# Patient Record
Sex: FEMALE | Race: WHITE | NOT HISPANIC OR LATINO | Employment: OTHER | URBAN - METROPOLITAN AREA
[De-identification: names, ages, dates, MRNs, and addresses within clinical notes are randomized per-mention and may not be internally consistent; named-entity substitution may affect disease eponyms.]

---

## 2020-08-17 LAB
ALBUMIN SERPL-MCNC: 4 G/DL (ref 3.5–4.6)
ALP BLD-CCNC: 44 U/L (ref 40–130)
ALT SERPL-CCNC: 9 U/L (ref 0–33)
ANION GAP SERPL CALCULATED.3IONS-SCNC: 13 MEQ/L (ref 9–15)
AST SERPL-CCNC: 21 U/L (ref 0–35)
BILIRUB SERPL-MCNC: 0.8 MG/DL (ref 0.2–0.7)
BUN BLDV-MCNC: 20 MG/DL (ref 8–23)
CALCIUM SERPL-MCNC: 9.3 MG/DL (ref 8.5–9.9)
CHLORIDE BLD-SCNC: 101 MEQ/L (ref 95–107)
CHOLESTEROL, TOTAL: 131 MG/DL (ref 0–199)
CO2: 23 MEQ/L (ref 20–31)
CREAT SERPL-MCNC: 0.67 MG/DL (ref 0.5–0.9)
GFR AFRICAN AMERICAN: >60
GFR NON-AFRICAN AMERICAN: >60
GLOBULIN: 3.1 G/DL (ref 2.3–3.5)
GLUCOSE BLD-MCNC: 86 MG/DL (ref 70–99)
HDLC SERPL-MCNC: 49 MG/DL (ref 40–59)
LDL CHOLESTEROL CALCULATED: 69 MG/DL (ref 0–129)
POTASSIUM SERPL-SCNC: 4.3 MEQ/L (ref 3.4–4.9)
SODIUM BLD-SCNC: 137 MEQ/L (ref 135–144)
T4 FREE: 0.94 NG/DL (ref 0.84–1.68)
TOTAL PROTEIN: 7.1 G/DL (ref 6.3–8)
TRIGL SERPL-MCNC: 63 MG/DL (ref 0–150)
TSH SERPL DL<=0.05 MIU/L-ACNC: 3.12 UIU/ML (ref 0.44–3.86)

## 2023-11-28 ENCOUNTER — APPOINTMENT (OUTPATIENT)
Dept: RADIOLOGY | Facility: HOSPITAL | Age: 83
End: 2023-11-28
Payer: MEDICARE

## 2023-11-28 ENCOUNTER — HOSPITAL ENCOUNTER (OUTPATIENT)
Facility: HOSPITAL | Age: 83
Setting detail: OBSERVATION
Discharge: NURSING, REHAB OR LONG-TERM CARE | End: 2023-12-01
Attending: EMERGENCY MEDICINE | Admitting: INTERNAL MEDICINE
Payer: MEDICARE

## 2023-11-28 ENCOUNTER — APPOINTMENT (OUTPATIENT)
Dept: CARDIOLOGY | Facility: HOSPITAL | Age: 83
End: 2023-11-28
Payer: MEDICARE

## 2023-11-28 DIAGNOSIS — R42 DIZZINESS: ICD-10-CM

## 2023-11-28 DIAGNOSIS — D64.9 ANEMIA, UNSPECIFIED TYPE: ICD-10-CM

## 2023-11-28 DIAGNOSIS — N17.9 ACUTE KIDNEY INJURY (CMS-HCC): ICD-10-CM

## 2023-11-28 DIAGNOSIS — R26.2 UNABLE TO AMBULATE: ICD-10-CM

## 2023-11-28 DIAGNOSIS — N30.00 ACUTE CYSTITIS WITHOUT HEMATURIA: ICD-10-CM

## 2023-11-28 DIAGNOSIS — R53.1 GENERALIZED WEAKNESS: Primary | ICD-10-CM
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
## 2023-11-28 LAB
ALBUMIN SERPL BCP-MCNC: 4.1 G/DL (ref 3.4–5)
ALP SERPL-CCNC: 56 U/L (ref 33–136)
ALT SERPL W P-5'-P-CCNC: 13 U/L (ref 7–45)
ANION GAP SERPL CALC-SCNC: 13 MMOL/L (ref 10–20)
APPEARANCE UR: CLEAR
AST SERPL W P-5'-P-CCNC: 26 U/L (ref 9–39)
BACTERIA #/AREA URNS AUTO: ABNORMAL /HPF
BASOPHILS # BLD AUTO: 0.03 X10*3/UL (ref 0–0.1)
BASOPHILS NFR BLD AUTO: 0.3 %
BILIRUB SERPL-MCNC: 0.8 MG/DL (ref 0–1.2)
BILIRUB UR STRIP.AUTO-MCNC: NEGATIVE MG/DL
BNP SERPL-MCNC: 94 PG/ML (ref 0–99)
BUN SERPL-MCNC: 30 MG/DL (ref 6–23)
CALCIUM SERPL-MCNC: 9.5 MG/DL (ref 8.6–10.3)
CARDIAC TROPONIN I PNL SERPL HS: 23 NG/L (ref 0–13)
CARDIAC TROPONIN I PNL SERPL HS: 24 NG/L (ref 0–13)
CHLORIDE SERPL-SCNC: 104 MMOL/L (ref 98–107)
CK SERPL-CCNC: 123 U/L (ref 0–215)
CO2 SERPL-SCNC: 25 MMOL/L (ref 21–32)
COLOR UR: YELLOW
CREAT SERPL-MCNC: 1.22 MG/DL (ref 0.5–1.05)
EOSINOPHIL # BLD AUTO: 0.04 X10*3/UL (ref 0–0.4)
EOSINOPHIL NFR BLD AUTO: 0.4 %
ERYTHROCYTE [DISTWIDTH] IN BLOOD BY AUTOMATED COUNT: 14.3 % (ref 11.5–14.5)
FLUAV RNA RESP QL NAA+PROBE: NOT DETECTED
FLUBV RNA RESP QL NAA+PROBE: NOT DETECTED
FOLATE SERPL-MCNC: >22.3 NG/ML
GFR SERPL CREATININE-BSD FRML MDRD: 44 ML/MIN/1.73M*2
GLUCOSE SERPL-MCNC: 103 MG/DL (ref 74–99)
GLUCOSE UR STRIP.AUTO-MCNC: NEGATIVE MG/DL
HCT VFR BLD AUTO: 31.3 % (ref 36–46)
HGB BLD-MCNC: 10.9 G/DL (ref 12–16)
HGB RETIC QN: 36 PG (ref 28–38)
HOLD SPECIMEN: NORMAL
IMM GRANULOCYTES # BLD AUTO: 0.02 X10*3/UL (ref 0–0.5)
IMM GRANULOCYTES NFR BLD AUTO: 0.2 % (ref 0–0.9)
IMMATURE RETIC FRACTION: 14.8 %
INR PPP: 1.1 (ref 0.9–1.1)
IRON SATN MFR SERPL: 15 % (ref 25–45)
IRON SERPL-MCNC: 46 UG/DL (ref 35–150)
KETONES UR STRIP.AUTO-MCNC: ABNORMAL MG/DL
LACTATE SERPL-SCNC: 1.3 MMOL/L (ref 0.4–2)
LEUKOCYTE ESTERASE UR QL STRIP.AUTO: ABNORMAL
LYMPHOCYTES # BLD AUTO: 1.58 X10*3/UL (ref 0.8–3)
LYMPHOCYTES NFR BLD AUTO: 15.8 %
MAGNESIUM SERPL-MCNC: 1.92 MG/DL (ref 1.6–2.4)
MCH RBC QN AUTO: 43.3 PG (ref 26–34)
MCHC RBC AUTO-ENTMCNC: 34.8 G/DL (ref 32–36)
MCV RBC AUTO: 124 FL (ref 80–100)
MONOCYTES # BLD AUTO: 1.33 X10*3/UL (ref 0.05–0.8)
MONOCYTES NFR BLD AUTO: 13.3 %
MUCOUS THREADS #/AREA URNS AUTO: ABNORMAL /LPF
NEUTROPHILS # BLD AUTO: 7.01 X10*3/UL (ref 1.6–5.5)
NEUTROPHILS NFR BLD AUTO: 70 %
NITRITE UR QL STRIP.AUTO: NEGATIVE
NRBC BLD-RTO: 0 /100 WBCS (ref 0–0)
OVALOCYTES BLD QL SMEAR: NORMAL
PATH REVIEW-CBC DIFFERENTIAL: NORMAL
PH UR STRIP.AUTO: 5 [PH]
PLATELET # BLD AUTO: 244 X10*3/UL (ref 150–450)
POTASSIUM SERPL-SCNC: 4.3 MMOL/L (ref 3.5–5.3)
PROT SERPL-MCNC: 7.9 G/DL (ref 6.4–8.2)
PROT UR STRIP.AUTO-MCNC: ABNORMAL MG/DL
PROTHROMBIN TIME: 12.5 SECONDS (ref 9.8–12.8)
RBC # BLD AUTO: 2.52 X10*6/UL (ref 4–5.2)
RBC # UR STRIP.AUTO: NEGATIVE /UL
RBC #/AREA URNS AUTO: ABNORMAL /HPF
RBC MORPH BLD: NORMAL
RETICS #: 0.05 X10*6/UL (ref 0.02–0.11)
RETICS/RBC NFR AUTO: 2.1 % (ref 0.5–2)
SARS-COV-2 RNA RESP QL NAA+PROBE: NOT DETECTED
SODIUM SERPL-SCNC: 138 MMOL/L (ref 136–145)
SP GR UR STRIP.AUTO: 1.02
TIBC SERPL-MCNC: 313 UG/DL (ref 240–445)
TSH SERPL-ACNC: 4.84 MIU/L (ref 0.44–3.98)
UIBC SERPL-MCNC: 267 UG/DL (ref 110–370)
UROBILINOGEN UR STRIP.AUTO-MCNC: <2 MG/DL
VIT B12 SERPL-MCNC: 54 PG/ML (ref 211–911)
WBC # BLD AUTO: 10 X10*3/UL (ref 4.4–11.3)
WBC #/AREA URNS AUTO: ABNORMAL /HPF

## 2023-11-28 PROCEDURE — 85025 COMPLETE CBC W/AUTO DIFF WBC: CPT | Performed by: PHYSICIAN ASSISTANT

## 2023-11-28 PROCEDURE — 80053 COMPREHEN METABOLIC PANEL: CPT | Performed by: PHYSICIAN ASSISTANT

## 2023-11-28 PROCEDURE — 71045 X-RAY EXAM CHEST 1 VIEW: CPT | Performed by: RADIOLOGY

## 2023-11-28 PROCEDURE — 83880 ASSAY OF NATRIURETIC PEPTIDE: CPT | Performed by: PHYSICIAN ASSISTANT

## 2023-11-28 PROCEDURE — 83540 ASSAY OF IRON: CPT | Performed by: INTERNAL MEDICINE

## 2023-11-28 PROCEDURE — 93005 ELECTROCARDIOGRAM TRACING: CPT

## 2023-11-28 PROCEDURE — 73610 X-RAY EXAM OF ANKLE: CPT | Mod: RT,FY

## 2023-11-28 PROCEDURE — 72125 CT NECK SPINE W/O DYE: CPT

## 2023-11-28 PROCEDURE — 82607 VITAMIN B-12: CPT | Performed by: INTERNAL MEDICINE

## 2023-11-28 PROCEDURE — 83735 ASSAY OF MAGNESIUM: CPT | Performed by: PHYSICIAN ASSISTANT

## 2023-11-28 PROCEDURE — 2500000004 HC RX 250 GENERAL PHARMACY W/ HCPCS (ALT 636 FOR OP/ED): Performed by: PHYSICIAN ASSISTANT

## 2023-11-28 PROCEDURE — 82746 ASSAY OF FOLIC ACID SERUM: CPT | Performed by: INTERNAL MEDICINE

## 2023-11-28 PROCEDURE — 36415 COLL VENOUS BLD VENIPUNCTURE: CPT | Performed by: PHYSICIAN ASSISTANT

## 2023-11-28 PROCEDURE — G0378 HOSPITAL OBSERVATION PER HR: HCPCS

## 2023-11-28 PROCEDURE — 96365 THER/PROPH/DIAG IV INF INIT: CPT | Performed by: INTERNAL MEDICINE

## 2023-11-28 PROCEDURE — 71045 X-RAY EXAM CHEST 1 VIEW: CPT | Mod: FY

## 2023-11-28 PROCEDURE — 87186 SC STD MICRODIL/AGAR DIL: CPT | Mod: ELYLAB | Performed by: PHYSICIAN ASSISTANT

## 2023-11-28 PROCEDURE — 84443 ASSAY THYROID STIM HORMONE: CPT | Performed by: INTERNAL MEDICINE

## 2023-11-28 PROCEDURE — 70450 CT HEAD/BRAIN W/O DYE: CPT | Performed by: RADIOLOGY

## 2023-11-28 PROCEDURE — 70450 CT HEAD/BRAIN W/O DYE: CPT

## 2023-11-28 PROCEDURE — 84484 ASSAY OF TROPONIN QUANT: CPT | Performed by: PHYSICIAN ASSISTANT

## 2023-11-28 PROCEDURE — 2500000004 HC RX 250 GENERAL PHARMACY W/ HCPCS (ALT 636 FOR OP/ED): Performed by: INTERNAL MEDICINE

## 2023-11-28 PROCEDURE — 73610 X-RAY EXAM OF ANKLE: CPT | Mod: RIGHT SIDE | Performed by: RADIOLOGY

## 2023-11-28 PROCEDURE — 85045 AUTOMATED RETICULOCYTE COUNT: CPT | Performed by: INTERNAL MEDICINE

## 2023-11-28 PROCEDURE — 99285 EMERGENCY DEPT VISIT HI MDM: CPT | Performed by: EMERGENCY MEDICINE

## 2023-11-28 PROCEDURE — 85060 BLOOD SMEAR INTERPRETATION: CPT | Performed by: PHYSICIAN ASSISTANT

## 2023-11-28 PROCEDURE — 83605 ASSAY OF LACTIC ACID: CPT | Performed by: PHYSICIAN ASSISTANT

## 2023-11-28 PROCEDURE — 72125 CT NECK SPINE W/O DYE: CPT | Performed by: RADIOLOGY

## 2023-11-28 PROCEDURE — 87086 URINE CULTURE/COLONY COUNT: CPT | Mod: ELYLAB | Performed by: PHYSICIAN ASSISTANT

## 2023-11-28 PROCEDURE — 85610 PROTHROMBIN TIME: CPT | Performed by: PHYSICIAN ASSISTANT

## 2023-11-28 PROCEDURE — 72131 CT LUMBAR SPINE W/O DYE: CPT

## 2023-11-28 PROCEDURE — 82550 ASSAY OF CK (CPK): CPT | Performed by: PHYSICIAN ASSISTANT

## 2023-11-28 PROCEDURE — 96375 TX/PRO/DX INJ NEW DRUG ADDON: CPT | Performed by: INTERNAL MEDICINE

## 2023-11-28 PROCEDURE — 87636 SARSCOV2 & INF A&B AMP PRB: CPT | Performed by: PHYSICIAN ASSISTANT

## 2023-11-28 PROCEDURE — 99223 1ST HOSP IP/OBS HIGH 75: CPT | Performed by: INTERNAL MEDICINE

## 2023-11-28 PROCEDURE — 82728 ASSAY OF FERRITIN: CPT | Mod: ELYLAB | Performed by: INTERNAL MEDICINE

## 2023-11-28 PROCEDURE — 72131 CT LUMBAR SPINE W/O DYE: CPT | Performed by: RADIOLOGY

## 2023-11-28 PROCEDURE — 81001 URINALYSIS AUTO W/SCOPE: CPT | Performed by: PHYSICIAN ASSISTANT

## 2023-11-28 RX ORDER — ACETAMINOPHEN 160 MG/5ML
650 SOLUTION ORAL EVERY 4 HOURS PRN
Status: DISCONTINUED | OUTPATIENT
Start: 2023-11-28 | End: 2023-11-29

## 2023-11-28 RX ORDER — SODIUM CHLORIDE, SODIUM LACTATE, POTASSIUM CHLORIDE, CALCIUM CHLORIDE 600; 310; 30; 20 MG/100ML; MG/100ML; MG/100ML; MG/100ML
75 INJECTION, SOLUTION INTRAVENOUS CONTINUOUS
Status: DISCONTINUED | OUTPATIENT
Start: 2023-11-28 | End: 2023-11-30

## 2023-11-28 RX ORDER — MECLIZINE HCL 12.5 MG 12.5 MG/1
25 TABLET ORAL 3 TIMES DAILY PRN
Status: DISCONTINUED | OUTPATIENT
Start: 2023-11-28 | End: 2023-12-01 | Stop reason: HOSPADM

## 2023-11-28 RX ORDER — POLYETHYLENE GLYCOL 3350 17 G/17G
17 POWDER, FOR SOLUTION ORAL DAILY PRN
Status: DISCONTINUED | OUTPATIENT
Start: 2023-11-28 | End: 2023-12-01 | Stop reason: HOSPADM

## 2023-11-28 RX ORDER — ATORVASTATIN CALCIUM 10 MG/1
10 TABLET, FILM COATED ORAL NIGHTLY
COMMUNITY

## 2023-11-28 RX ORDER — ACETAMINOPHEN 325 MG/1
650 TABLET ORAL ONCE
Status: COMPLETED | OUTPATIENT
Start: 2023-11-28 | End: 2023-11-28

## 2023-11-28 RX ORDER — TRAMADOL HYDROCHLORIDE 50 MG/1
50 TABLET ORAL EVERY 8 HOURS PRN
Status: DISCONTINUED | OUTPATIENT
Start: 2023-11-28 | End: 2023-12-01 | Stop reason: HOSPADM

## 2023-11-28 RX ORDER — LOSARTAN POTASSIUM AND HYDROCHLOROTHIAZIDE 12.5; 1 MG/1; MG/1
1 TABLET ORAL DAILY
COMMUNITY

## 2023-11-28 RX ORDER — ACETAMINOPHEN 650 MG/1
650 SUPPOSITORY RECTAL EVERY 4 HOURS PRN
Status: DISCONTINUED | OUTPATIENT
Start: 2023-11-28 | End: 2023-11-29

## 2023-11-28 RX ORDER — ONDANSETRON HYDROCHLORIDE 2 MG/ML
4 INJECTION, SOLUTION INTRAVENOUS ONCE
Status: COMPLETED | OUTPATIENT
Start: 2023-11-28 | End: 2023-11-28

## 2023-11-28 RX ORDER — CEFTRIAXONE 1 G/50ML
1 INJECTION, SOLUTION INTRAVENOUS EVERY 24 HOURS
Status: DISCONTINUED | OUTPATIENT
Start: 2023-11-28 | End: 2023-12-01 | Stop reason: HOSPADM

## 2023-11-28 RX ORDER — ACETAMINOPHEN 325 MG/1
650 TABLET ORAL EVERY 4 HOURS PRN
Status: DISCONTINUED | OUTPATIENT
Start: 2023-11-28 | End: 2023-11-29

## 2023-11-28 RX ORDER — HEPARIN SODIUM 5000 [USP'U]/ML
5000 INJECTION, SOLUTION INTRAVENOUS; SUBCUTANEOUS EVERY 8 HOURS
Status: DISCONTINUED | OUTPATIENT
Start: 2023-11-28 | End: 2023-12-01 | Stop reason: HOSPADM

## 2023-11-28 RX ADMIN — ONDANSETRON 4 MG: 2 INJECTION INTRAMUSCULAR; INTRAVENOUS at 14:50

## 2023-11-28 RX ADMIN — SODIUM CHLORIDE, POTASSIUM CHLORIDE, SODIUM LACTATE AND CALCIUM CHLORIDE 75 ML/HR: 600; 310; 30; 20 INJECTION, SOLUTION INTRAVENOUS at 22:35

## 2023-11-28 RX ADMIN — ACETAMINOPHEN 650 MG: 325 TABLET ORAL at 17:23

## 2023-11-28 RX ADMIN — CEFTRIAXONE SODIUM 1 G: 1 INJECTION, SOLUTION INTRAVENOUS at 23:30

## 2023-11-28 SDOH — SOCIAL STABILITY: SOCIAL INSECURITY: DOES ANYONE TRY TO KEEP YOU FROM HAVING/CONTACTING OTHER FRIENDS OR DOING THINGS OUTSIDE YOUR HOME?: NO

## 2023-11-28 SDOH — SOCIAL STABILITY: SOCIAL INSECURITY: ABUSE: ADULT

## 2023-11-28 SDOH — SOCIAL STABILITY: SOCIAL INSECURITY: HAS ANYONE EVER THREATENED TO HURT YOUR FAMILY OR YOUR PETS?: NO

## 2023-11-28 SDOH — SOCIAL STABILITY: SOCIAL INSECURITY: DO YOU FEEL UNSAFE GOING BACK TO THE PLACE WHERE YOU ARE LIVING?: NO

## 2023-11-28 SDOH — SOCIAL STABILITY: SOCIAL INSECURITY: HAVE YOU HAD THOUGHTS OF HARMING ANYONE ELSE?: NO

## 2023-11-28 SDOH — SOCIAL STABILITY: SOCIAL INSECURITY: ARE YOU OR HAVE YOU BEEN THREATENED OR ABUSED PHYSICALLY, EMOTIONALLY, OR SEXUALLY BY ANYONE?: NO

## 2023-11-28 SDOH — SOCIAL STABILITY: SOCIAL INSECURITY: DO YOU FEEL ANYONE HAS EXPLOITED OR TAKEN ADVANTAGE OF YOU FINANCIALLY OR OF YOUR PERSONAL PROPERTY?: NO

## 2023-11-28 SDOH — SOCIAL STABILITY: SOCIAL INSECURITY: WERE YOU ABLE TO COMPLETE ALL THE BEHAVIORAL HEALTH SCREENINGS?: YES

## 2023-11-28 SDOH — SOCIAL STABILITY: SOCIAL INSECURITY: ARE THERE ANY APPARENT SIGNS OF INJURIES/BEHAVIORS THAT COULD BE RELATED TO ABUSE/NEGLECT?: NO

## 2023-11-28 ASSESSMENT — ACTIVITIES OF DAILY LIVING (ADL)
FEEDING YOURSELF: INDEPENDENT
GROOMING: INDEPENDENT
TOILETING: INDEPENDENT
JUDGMENT_ADEQUATE_SAFELY_COMPLETE_DAILY_ACTIVITIES: YES
WALKS IN HOME: DEPENDENT
BATHING: INDEPENDENT
HEARING - LEFT EAR: DIFFICULTY WITH NOISE
DRESSING YOURSELF: INDEPENDENT
HEARING - RIGHT EAR: DIFFICULTY WITH NOISE
ADEQUATE_TO_COMPLETE_ADL: YES
LACK_OF_TRANSPORTATION: NO
ASSISTIVE_DEVICE: DENTURES UPPER;WHEELCHAIR
PATIENT'S MEMORY ADEQUATE TO SAFELY COMPLETE DAILY ACTIVITIES?: YES

## 2023-11-28 ASSESSMENT — COLUMBIA-SUICIDE SEVERITY RATING SCALE - C-SSRS
1. IN THE PAST MONTH, HAVE YOU WISHED YOU WERE DEAD OR WISHED YOU COULD GO TO SLEEP AND NOT WAKE UP?: NO
1. IN THE PAST MONTH, HAVE YOU WISHED YOU WERE DEAD OR WISHED YOU COULD GO TO SLEEP AND NOT WAKE UP?: NO
2. HAVE YOU ACTUALLY HAD ANY THOUGHTS OF KILLING YOURSELF?: NO
2. HAVE YOU ACTUALLY HAD ANY THOUGHTS OF KILLING YOURSELF?: NO
1. IN THE PAST MONTH, HAVE YOU WISHED YOU WERE DEAD OR WISHED YOU COULD GO TO SLEEP AND NOT WAKE UP?: NO
2. HAVE YOU ACTUALLY HAD ANY THOUGHTS OF KILLING YOURSELF?: NO
6. HAVE YOU EVER DONE ANYTHING, STARTED TO DO ANYTHING, OR PREPARED TO DO ANYTHING TO END YOUR LIFE?: NO

## 2023-11-28 ASSESSMENT — LIFESTYLE VARIABLES
HAVE PEOPLE ANNOYED YOU BY CRITICIZING YOUR DRINKING: NO
EVER FELT BAD OR GUILTY ABOUT YOUR DRINKING: NO
HOW MANY STANDARD DRINKS CONTAINING ALCOHOL DO YOU HAVE ON A TYPICAL DAY: PATIENT DOES NOT DRINK
HOW OFTEN DO YOU HAVE A DRINK CONTAINING ALCOHOL: NEVER
SKIP TO QUESTIONS 9-10: 1
HOW OFTEN DO YOU HAVE 6 OR MORE DRINKS ON ONE OCCASION: NEVER
AUDIT-C TOTAL SCORE: 0
HAVE YOU EVER FELT YOU SHOULD CUT DOWN ON YOUR DRINKING: NO
AUDIT-C TOTAL SCORE: 0
REASON UNABLE TO ASSESS: YES
EVER HAD A DRINK FIRST THING IN THE MORNING TO STEADY YOUR NERVES TO GET RID OF A HANGOVER: NO

## 2023-11-28 ASSESSMENT — COGNITIVE AND FUNCTIONAL STATUS - GENERAL
CLIMB 3 TO 5 STEPS WITH RAILING: TOTAL
STANDING UP FROM CHAIR USING ARMS: A LITTLE
WALKING IN HOSPITAL ROOM: A LOT
HELP NEEDED FOR BATHING: A LITTLE
DAILY ACTIVITIY SCORE: 21
MOVING TO AND FROM BED TO CHAIR: A LITTLE
TOILETING: A LITTLE
MOBILITY SCORE: 17
PATIENT BASELINE BEDBOUND: NO
DRESSING REGULAR LOWER BODY CLOTHING: A LITTLE

## 2023-11-28 ASSESSMENT — PAIN - FUNCTIONAL ASSESSMENT
PAIN_FUNCTIONAL_ASSESSMENT: 0-10
PAIN_FUNCTIONAL_ASSESSMENT: 0-10

## 2023-11-28 ASSESSMENT — PATIENT HEALTH QUESTIONNAIRE - PHQ9
1. LITTLE INTEREST OR PLEASURE IN DOING THINGS: NOT AT ALL
2. FEELING DOWN, DEPRESSED OR HOPELESS: NOT AT ALL
SUM OF ALL RESPONSES TO PHQ9 QUESTIONS 1 & 2: 0

## 2023-11-28 ASSESSMENT — PAIN SCALES - GENERAL
PAINLEVEL_OUTOF10: 0 - NO PAIN
PAINLEVEL_OUTOF10: 3
PAINLEVEL_OUTOF10: 0 - NO PAIN

## 2023-11-28 ASSESSMENT — PAIN DESCRIPTION - LOCATION: LOCATION: HEAD

## 2023-11-28 NOTE — ED PROVIDER NOTES
HPI   Chief Complaint   Patient presents with    Fall     Slid out of bed at 0100. Pt states that she caught herself and did not fall. Negative thinners, NIH negative.       An 83-year-old female patient gets around her home via wheelchair lives at home alone comes in the emergency department today with complaints of low back pain, ankle pain secondary to a fall out of her bed.  States she was transferring herself from her bed to her wheelchair like she does every day and has been doing very well doing so and she slid out of her bed onto the ground.  States she was unable to get herself back up.  States she she laid on the floor the whole night until her son was able to come and help her but was unable to get her up therefore EMS was called.  Rates pain a 5 out of 10 on the pain scale this present time.  Denies any her head or loss consciousness.  She has no other complaints this present time.  For this purpose she comes in the emergency department today for further evaluation.                          Salome Coma Scale Score: 15                  Patient History   No past medical history on file.  Past Surgical History:   Procedure Laterality Date    OTHER SURGICAL HISTORY  08/12/2020    Leg surgery     No family history on file.  Social History     Tobacco Use    Smoking status: Not on file    Smokeless tobacco: Not on file   Substance Use Topics    Alcohol use: Not on file    Drug use: Not on file       Physical Exam   ED Triage Vitals [11/28/23 1205]   Temp Heart Rate Resp BP   37.3 °C (99.1 °F) 84 20 176/69      SpO2 Temp src Heart Rate Source Patient Position   99 % -- -- --      BP Location FiO2 (%)     -- --       Physical Exam  Constitutional:       General: She is in acute distress (Mild distress).      Appearance: Normal appearance. She is not ill-appearing or diaphoretic.   HENT:      Head: Normocephalic and atraumatic.      Nose: Nose normal.   Eyes:      Extraocular Movements: Extraocular movements  intact.      Conjunctiva/sclera: Conjunctivae normal.      Pupils: Pupils are equal, round, and reactive to light.   Cardiovascular:      Rate and Rhythm: Normal rate and regular rhythm.   Pulmonary:      Effort: Pulmonary effort is normal. No respiratory distress.      Breath sounds: Normal breath sounds. No stridor. No wheezing.   Abdominal:      General: Abdomen is flat.      Palpations: Abdomen is soft.      Tenderness: There is no abdominal tenderness.   Musculoskeletal:         General: Tenderness (Tense palpation of the right lateral ankle, no pelvic tenderness on exam hip tenderness, shoulder tenderness) present. Normal range of motion.      Cervical back: Normal range of motion.   Skin:     General: Skin is warm and dry.   Neurological:      General: No focal deficit present.      Mental Status: She is alert and oriented to person, place, and time. Mental status is at baseline.   Psychiatric:         Mood and Affect: Mood normal.       ED Course & MDM   Diagnoses as of 11/28/23 1645   Generalized weakness   Unable to ambulate   Anemia, unspecified type   Acute kidney injury (CMS/HCC)       Medical Decision Making  An 83-year-old female patient gets around her home via wheelchair lives at home alone comes in the emergency department today with complaints of low back pain, ankle pain secondary to a fall out of her bed.  States she was transferring herself from her bed to her wheelchair like she does every day and has been doing very well doing so and she slid out of her bed onto the ground.  States she was unable to get herself back up.  States she she laid on the floor the whole night until her son was able to come and help her but was unable to get her up therefore EMS was called.  Rates pain a 5 out of 10 on the pain scale this present time.  Denies any her head or loss consciousness.  She has no other complaints this present time.  For this purpose she comes in the emergency department today for further  evaluation.    EKG, laboratory studies, CT study of the head, C-spine, L-spine ordered as well as ankle x-rays.  Rule out any acute intracranial bleed, calvarial fracture, spinal injury, ankle injury fracture or dislocation.  Rule out ACS, arrhythmia, electrolyte abnormality, urinary tract infection, elevated CK.    Patient's initial troponin elevated at 24-second 123 no ischemic findings on EKG.  Patient CBC does show a mild anemia with hemoglobin 10.9 hematocrit 31.3.  Patient's flu COVID test are negative as well.  BNP 94.  Patient has mild dehydration with creatinine 1.22 GFR 44.  Magnesium lactate INR all within normal limits.  As far as radiologic studies go lumbar films show no acute vertebral compression or deformity.  CT study of the head shows no acute intracranial hemorrhage or mass effect but does discuss protrusion of meninges and CSF through some occipital craniectomy defect.  Did discuss with the patient.  States she had procedure performed in 1998 secondary to fluid not coming out of her head.  States they removed things in her upper spine as well as head to get the fluid back through.  She was aware of these findings per her.'s x-ray of the ankle is negative for acute fracture or dislocation chest x-ray no acute cardiopulmonary abnormality.  I discussed all the findings with patient and son.  As patient does live alone and is wheelchair-bound would like to bring patient in for PT OT eval and rehab placement to get some strength back up so that she is able to function appropriately on her own at home.  Patient agrees and would like to move forward with this.    Historian is the patient    Diagnosis: Generalized weakness, unable to ambulate, anemia, acute renal insufficiency    I discussed the case with Dr. Monique agrees admit the patient under his service      Labs Reviewed   CBC WITH AUTO DIFFERENTIAL - Abnormal       Result Value    WBC 10.0      nRBC 0.0      RBC 2.52 (*)     Hemoglobin 10.9 (*)      Hematocrit 31.3 (*)      (*)     MCH 43.3 (*)     MCHC 34.8      RDW 14.3      Platelets 244      Neutrophils % 70.0      Immature Granulocytes %, Automated 0.2      Lymphocytes % 15.8      Monocytes % 13.3      Eosinophils % 0.4      Basophils % 0.3      Neutrophils Absolute 7.01 (*)     Immature Granulocytes Absolute, Automated 0.02      Lymphocytes Absolute 1.58      Monocytes Absolute 1.33 (*)     Eosinophils Absolute 0.04      Basophils Absolute 0.03     COMPREHENSIVE METABOLIC PANEL - Abnormal    Glucose 103 (*)     Sodium 138      Potassium 4.3      Chloride 104      Bicarbonate 25      Anion Gap 13      Urea Nitrogen 30 (*)     Creatinine 1.22 (*)     eGFR 44 (*)     Calcium 9.5      Albumin 4.1      Alkaline Phosphatase 56      Total Protein 7.9      AST 26      Bilirubin, Total 0.8      ALT 13     SERIAL TROPONIN-INITIAL - Abnormal    Troponin I, High Sensitivity 24 (*)     Narrative:     Less than 99th percentile of normal range cutoff-  Female and children under 18 years old <14 ng/L; Male <21 ng/L: Negative  Repeat testing should be performed if clinically indicated.     Female and children under 18 years old 14-50 ng/L; Male 21-50 ng/L:  Consistent with possible cardiac damage and possible increased clinical   risk. Serial measurements may help to assess extent of myocardial damage.     >50 ng/L: Consistent with cardiac damage, increased clinical risk and  myocardial infarction. Serial measurements may help assess extent of   myocardial damage.      NOTE: Children less than 1 year old may have higher baseline troponin   levels and results should be interpreted in conjunction with the overall   clinical context.     NOTE: Troponin I testing is performed using a different   testing methodology at [facility] than at other   [facility] hospitals. Direct result comparisons should only   be made within the same method.   SERIAL TROPONIN, 1 HOUR - Abnormal    Troponin I, High Sensitivity  23 (*)     Narrative:     Less than 99th percentile of normal range cutoff-  Female and children under 18 years old <14 ng/L; Male <21 ng/L: Negative  Repeat testing should be performed if clinically indicated.     Female and children under 18 years old 14-50 ng/L; Male 21-50 ng/L:  Consistent with possible cardiac damage and possible increased clinical   risk. Serial measurements may help to assess extent of myocardial damage.     >50 ng/L: Consistent with cardiac damage, increased clinical risk and  myocardial infarction. Serial measurements may help assess extent of   myocardial damage.      NOTE: Children less than 1 year old may have higher baseline troponin   levels and results should be interpreted in conjunction with the overall   clinical context.     NOTE: Troponin I testing is performed using a different   testing methodology at [facility] than at other   [facility]. Direct result comparisons should only   be made within the same method.   MAGNESIUM - Normal    Magnesium 1.92     LACTATE - Normal    Lactate 1.3      Narrative:     Venipuncture immediately after or during the administration of Metamizole may lead to falsely low results. Testing should be performed immediately  prior to Metamizole dosing.   PROTIME-INR - Normal    Protime 12.5      INR 1.1     B-TYPE NATRIURETIC PEPTIDE - Normal    BNP 94      Narrative:        <100 pg/mL - Heart failure unlikely  100-299 pg/mL - Intermediate probability of acute heart                  failure exacerbation. Correlate with clinical                  context and patient history.    >=300 pg/mL - Heart Failure likely. Correlate with clinical                  context and patient history.    BNP testing is performed using different testing methodology at [facility] than at other [facility]. Direct result comparisons should only be made within the same method.      SARS-COV-2 AND INFLUENZA A/B PCR - Normal    Flu A Result  Not Detected      Flu B Result Not Detected      Coronavirus 2019, PCR Not Detected      Narrative:     This assay has received FDA Emergency Use Authorization (EUA) and  is only authorized for the duration of time that circumstances exist to justify the authorization of the emergency use of in vitro diagnostic tests for the detection of SARS-CoV-2 virus and/or diagnosis of COVID-19 infection under section 564(b)(1) of the Act, 21 U.S.C. 360bbb-3(b)(1). Testing for SARS-CoV-2 is only recommended for patients who meet current clinical and/or epidemiological criteria as defined by federal, state, or local public health directives. This assay is an in vitro diagnostic nucleic acid amplification test for the qualitative detection of SARS-CoV-2, Influenza A, and Influenza B from nasopharyngeal specimens and has been validated for use at [facility]. Negative results do not preclude COVID-19 infections or Influenza A/B infections, and should not be used as the sole basis for diagnosis, treatment, or other management decisions. If Influenza A/B and RSV PCR results are negative, testing for Parainfluenza virus, Adenovirus and Metapneumovirus is routinely performed for [facility] pediatric oncology and intensive care inpatients, and is available on other patients by placing an add-on request.    CREATINE KINASE - Normal    Creatine Kinase 123     TROPONIN SERIES- (INITIAL, 1 HR)    Narrative:     The following orders were created for panel order Troponin I Series, High Sensitivity (0, 1 HR).  Procedure                               Abnormality         Status                     ---------                               -----------         ------                     Troponin I, High Sensiti...[[id number]]  Abnormal            Final result               Troponin, High Sensitivi...[[id number]]  Abnormal            Final result                 Please view results for these tests on the individual orders.   URINALYSIS  WITH REFLEX MICROSCOPIC AND CULTURE   PATH REVIEW-CBC DIFFERENTIAL    Pathologist Review-CBC Differential        Value: Macrocytosis and hypersegmented neutrophils suggestive of Folate or B12 deficiency.   MORPHOLOGY    RBC Morphology See Below      Ovalocytes Few          CT lumbar spine wo IV contrast   Final Result   No lumbar vertebral compression deformity or acute displaced fracture.        Scoliosis with multilevel degenerative changes and mild   spondylolisthesis.        Paraspinal/soft tissue findings as above including rectal fecal   retention and cholelithiasis.        MACRO:   None.        Signed by: Kimi Louis 11/28/2023 2:06 PM   Dictation workstation:   [id number]      CT head wo IV contrast   Final Result   No acute intracranial hemorrhage or mass-effect.        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        MACRO:   None.        Signed by: Kimi Louis 11/28/2023 1:55 PM   Dictation workstation:   [id number]      CT cervical spine wo IV contrast   Final Result   No cervical vertebral compression deformity or acute displaced   fracture. Tiny smooth possible remote fracture defect in the anterior   arch of C1 and probable surgical absence of the posterior arch of C1.   Patient is also status post suboccipital craniectomy.        Multilevel degenerative changes with mild spondylolisthesis.        Reticular densities in both lung apices, differential includes   pulmonary fibrosis.        MACRO:   None.        Signed by: Kimi Louis 11/28/2023 2:00 PM   Dictation workstation:   [id number]      XR ankle right 3+ views   Final Result   1. No evidence of acute fracture or dislocation.        MACRO:   None.        Signed by: Yohannes Velasquez 11/28/2023 1:31 PM   Dictation workstation:   [id number]      XR chest 1 view   Final Result   No acute cardiopulmonary abnormality.        Diffuse reticular interstitial pulmonary opacities suggestive of   fibrotic change.         Signed by: Edilberto Yoon 11/28/2023 1:09 PM   Dictation workstation:   [id number]            Procedure  ECG 12 lead    Performed by: Conor Diamond PA-C  Authorized by: Conor Diamond PA-C    Interpretation:     Details:  My EKG interpretation  Rate:     ECG rate:  85    ECG rate assessment: normal    Rhythm:     Rhythm: sinus rhythm and A-V block      A-V block: 1st Degree    ST segments:     ST segments:  Normal  T waves:     T waves: normal         Conor Diamond PA-C  11/28/23 1409

## 2023-11-28 NOTE — H&P
Medical Group History and Physical    ASSESSMENT & PLAN:     Mechanical fall  Generalized weakness  Chronic dizziness  Chronic cervical spinal DJD  Chronic lumbar spinal DJD  R ankle pain  - imaging in ED neg for acute findings  - CK wnl  - no fnd on exam, largely benign exam  Plan:  - PT/OT for possible placement  - pain control  - gentle IVF hydration  - check orthostatics once  - monitor on tele  - PRN meclizine  - check TSH    CKD3  - at baseline, monitor    Anemia  - Hgb 10.9 on admission, no recent baseline  - no obvious s/s bleeding  - check basic anemia labs    HTN  DLD  - home meds    Vte ppx sqh  Pt/ot ordered  DNR/DNI  Home meds once reconciled    Roger Phillips MD    HISTORY OF PRESENT ILLNESS:   Chief Complaint: mechanical fall, weakness    History Of Present Illness:    83F with PMH of HTN, DLD, anxiety who is presenting for mechanical fall from bed. She was transferring from bed to wheelchair, and slipped and fell to the ground from the bed. She was unable to get up. She have some low back pain and R ankle pain after the fall. She did not have syncope. She has been having worsening generalized weakness for the past year or two, unable to say exactly, she is poor historian. She also says one of her big issues is dizziness. Has been using a wheelchair since then for this. She lives alone. She is DNR/DNI.     In the ED, afebrile, HDS, on RA. Labs and imaging reviewed. No acute findings on imaging. Labs showed CKD3 stable, anemia Hgb 10.9, mild trop elevation 24, 23. EKG neg for acute ischemic changes. COVID/flu neg. CXR with b/l reticular opacities, possibly fibrosis.       Review of systems: 10 point review of systems is otherwise negative except as mentioned above.    PAST HISTORIES:     Past Medical History:  She has no past medical history on file.    Past Surgical History:  She has a past surgical history that includes Other surgical history (08/12/2020).      Social History:  She has no history  "on file for tobacco use, alcohol use, and drug use.    Family History:  No family history on file.     Allergies:  Patient has no known allergies.    OBJECTIVE:     Last Recorded Vitals:  Vitals:    11/28/23 1205 11/28/23 1622   BP: 176/69 119/69   BP Location:  Left arm   Patient Position:  Sitting   Pulse: 84 86   Resp: 20 18   Temp: 37.3 °C (99.1 °F)    SpO2: 99% 100%   Weight: 59 kg (130 lb)    Height: 1.422 m (4' 8\")        Last I/O:  No intake/output data recorded.    Physical Exam:  GEN: healthy appearing, appears stated age, NAD  HEENT: NCAT  NECK: supple, no masses  CV: RRR, no m/r/g, trace LE edema  LUNGS: CTAB  ABD: soft, NT  SKIN: no rashes  MSK; no gross deformities, normal joints  NEURO: A+Ox3, no FND, generalized weakness 4/5 in all extremities  PSYCH: appropriate mood, affect      Scheduled Medications      PRN Medications      Continuous Medications      Outpatient Medications:  Prior to Admission medications    Not on File       LABS AND IMAGING:     Labs:  Results for orders placed or performed during the hospital encounter of 11/28/23 (from the past 24 hour(s))   Sars-CoV-2 and Influenza A/B PCR   Result Value Ref Range    Flu A Result Not Detected Not Detected    Flu B Result Not Detected Not Detected    Coronavirus 2019, PCR Not Detected Not Detected   CBC and Auto Differential   Result Value Ref Range    WBC 10.0 4.4 - 11.3 x10*3/uL    nRBC 0.0 0.0 - 0.0 /100 WBCs    RBC 2.52 (L) 4.00 - 5.20 x10*6/uL    Hemoglobin 10.9 (L) 12.0 - 16.0 g/dL    Hematocrit 31.3 (L) 36.0 - 46.0 %     (H) 80 - 100 fL    MCH 43.3 (H) 26.0 - 34.0 pg    MCHC 34.8 32.0 - 36.0 g/dL    RDW 14.3 11.5 - 14.5 %    Platelets 244 150 - 450 x10*3/uL    Neutrophils % 70.0 40.0 - 80.0 %    Immature Granulocytes %, Automated 0.2 0.0 - 0.9 %    Lymphocytes % 15.8 13.0 - 44.0 %    Monocytes % 13.3 2.0 - 10.0 %    Eosinophils % 0.4 0.0 - 6.0 %    Basophils % 0.3 0.0 - 2.0 %    Neutrophils Absolute 7.01 (H) 1.60 - 5.50 " x10*3/uL    Immature Granulocytes Absolute, Automated 0.02 0.00 - 0.50 x10*3/uL    Lymphocytes Absolute 1.58 0.80 - 3.00 x10*3/uL    Monocytes Absolute 1.33 (H) 0.05 - 0.80 x10*3/uL    Eosinophils Absolute 0.04 0.00 - 0.40 x10*3/uL    Basophils Absolute 0.03 0.00 - 0.10 x10*3/uL   Comprehensive Metabolic Panel   Result Value Ref Range    Glucose 103 (H) 74 - 99 mg/dL    Sodium 138 136 - 145 mmol/L    Potassium 4.3 3.5 - 5.3 mmol/L    Chloride 104 98 - 107 mmol/L    Bicarbonate 25 21 - 32 mmol/L    Anion Gap 13 10 - 20 mmol/L    Urea Nitrogen 30 (H) 6 - 23 mg/dL    Creatinine 1.22 (H) 0.50 - 1.05 mg/dL    eGFR 44 (L) >60 mL/min/1.73m*2    Calcium 9.5 8.6 - 10.3 mg/dL    Albumin 4.1 3.4 - 5.0 g/dL    Alkaline Phosphatase 56 33 - 136 U/L    Total Protein 7.9 6.4 - 8.2 g/dL    AST 26 9 - 39 U/L    Bilirubin, Total 0.8 0.0 - 1.2 mg/dL    ALT 13 7 - 45 U/L   Magnesium   Result Value Ref Range    Magnesium 1.92 1.60 - 2.40 mg/dL   Lactate   Result Value Ref Range    Lactate 1.3 0.4 - 2.0 mmol/L   Protime-INR   Result Value Ref Range    Protime 12.5 9.8 - 12.8 seconds    INR 1.1 0.9 - 1.1   B-Type Natriuretic Peptide   Result Value Ref Range    BNP 94 0 - 99 pg/mL   Creatine Kinase   Result Value Ref Range    Creatine Kinase 123 0 - 215 U/L   Troponin I, High Sensitivity, Initial   Result Value Ref Range    Troponin I, High Sensitivity 24 (H) 0 - 13 ng/L   Pathologist Review-CBC Differential   Result Value Ref Range    Pathologist Review-CBC Differential       Macrocytosis and hypersegmented neutrophils suggestive of Folate or B12 deficiency.   Morphology   Result Value Ref Range    RBC Morphology See Below     Ovalocytes Few    Troponin, High Sensitivity, 1 Hour   Result Value Ref Range    Troponin I, High Sensitivity 23 (H) 0 - 13 ng/L        Imaging:  CT lumbar spine wo IV contrast  Narrative: Interpreted By:  Kimi Louis,   STUDY:  CT LUMBAR SPINE WO IV CONTRAST  11/28/2023 1:39 pm       INDICATION:  Signs/Symptoms:Fall, pain      COMPARISON:  None.      ACCESSION NUMBER(S):  [id number]      ORDERING CLINICIAN:  CHRISTY BUSTILLOS      TECHNIQUE:  Contiguous axial CT images were obtained through the  lumbar spine  without contrast administration. Sagittal and coronal reconstructions  were generated.      FINDINGS:          ALIGNMENT:  Moderate levocurvature centered at approximately L2. Grade 1  retrolisthesis at L3-4. Grade 1 anterolisthesis at L5-S1.      VERTEBRAE/DISC SPACES:  No lumbar vertebral compression deformity or acute displaced  fracture. Diffuse intervertebral disc space narrowing and multilevel  vacuum discs. Diffuse endplate sclerosis and spurring. Bilateral  facet joint degenerative changes distally with adjacent smooth  calcifications/ossifications.      ADDITIONAL FINDINGS:  Smooth expansile possible remote fracture deformity of the left 12th  rib. Rounded isodense presumed splenule in the left upper quadrant.  Multiple calcified gallstones. Bilateral renal pelviectasis. Distal  colon diverticulosis. Dilated fecal filled rectum measuring 5.3 cm  transversely.      Impression: No lumbar vertebral compression deformity or acute displaced fracture.      Scoliosis with multilevel degenerative changes and mild  spondylolisthesis.      Paraspinal/soft tissue findings as above including rectal fecal  retention and cholelithiasis.      MACRO:  None.      Signed by: Kimi Louis 11/28/2023 2:06 PM  Dictation workstation:   [id number]  CT cervical spine wo IV contrast  Narrative: Interpreted By:  Kimi Louis,   STUDY:  CT CERVICAL SPINE WO IV CONTRAST;  11/28/2023 1:36 pm      INDICATION:  Signs/Symptoms:Fall.      COMPARISON:  None.      ACCESSION NUMBER(S):  [id number]      ORDERING CLINICIAN:  CHRISTY BUSTILLOS      TECHNIQUE:  CT images were obtained through the cervical spine. Sagittal and  coronal reconstructions were generated.      FINDINGS:          ALIGNMENT:  Grade 1  anterolisthesis at C6-7. Straightening of the midcervical  lordosis.      VERTEBRAE/DISC SPACES:  No compression deformity or acute displaced fracture. Absence of the  posterior ring of C1. Tiny defect of the anterior arch of C1 with  smooth margins. Atlantoaxial joint space narrowing with  calcifications and spurring. C5-6 and C6-7 intervertebral disc space  narrowing with endplate sclerosis and spurring. Tiny vacuum disc at  C6-7. Multilevel bilateral facet joint narrowing and spurring.      ADDITIONAL FINDINGS:  Suboccipital craniectomy defect. No abnormal thickening of the  prevertebral soft tissues. Reticular densities in the included  bilateral lung apices.      Impression: No cervical vertebral compression deformity or acute displaced  fracture. Tiny smooth possible remote fracture defect in the anterior  arch of C1 and probable surgical absence of the posterior arch of C1.  Patient is also status post suboccipital craniectomy.      Multilevel degenerative changes with mild spondylolisthesis.      Reticular densities in both lung apices, differential includes  pulmonary fibrosis.      MACRO:  None.      Signed by: Kimi Louis 11/28/2023 2:00 PM  Dictation workstation:   [id number]  CT head wo IV contrast  Narrative: Interpreted By:  Kimi Louis,   STUDY:  CT HEAD WO IV CONTRAST;  11/28/2023 1:36 pm      INDICATION:  Fall.      COMPARISON:  None.      ACCESSION NUMBER(S):  [id number]      ORDERING CLINICIAN:  CHRISTY BUSTILLOS      TECHNIQUE:  Unenhanced CT images of the head were obtained.      FINDINGS:  Diffuse cerebral atrophy with enlargement of the extra-axial spaces,  cerebral sulci and ventricular system. Faint symmetric likely  physiologic basal ganglia calcifications. Periventricular white  matter hypodensities bilaterally consistent with small vessel  ischemic disease. Protrusion of meninges and CSF attenuation through  suboccipital craniectomy defect. No acute intracranial hemorrhage  or  mass-effect. No midline shift. No extra-axial fluid collection.      No acute displaced calvarial fracture.      Visualized paranasal sinuses are clear.      Impression: No acute intracranial hemorrhage or mass-effect.      Protrusion of meninges and CSF through suboccipital craniectomy  defect.      MACRO:  None.      Signed by: Kimi Louis 11/28/2023 1:55 PM  Dictation workstation:   [id number]  XR ankle right 3+ views  Narrative: Interpreted By:  Yohannes Velasquez,   STUDY:  XR ANKLE RIGHT 3+ VIEWS; 11/28/2023 1:11 pm      INDICATION:  Signs/Symptoms:Pain.      COMPARISON:  None.      ACCESSION NUMBER(S):  [id number]      ORDERING CLINICIAN:  CHRISTY BUSTILLOS      TECHNIQUE:  Three views of the right ankle including AP , oblique and lateral  projections were obtained.      FINDINGS:  There is no evidence of acute fracture or dislocation identified. The  joint spaces are well preserved without significant degenerative  changes. No significant soft tissue swelling is seen.      Impression: 1. No evidence of acute fracture or dislocation.      MACRO:  None.      Signed by: Yohannes Velasquez 11/28/2023 1:31 PM  Dictation workstation:   [id number]  XR chest 1 view  Narrative: Interpreted By:  Edilberto Yoon,   STUDY:  XR CHEST 1 VIEW;  11/28/2023 12:59 pm      INDICATION:  Signs/Symptoms:Fall.      COMPARISON:  None.      ACCESSION NUMBER(S):  [id number]      ORDERING CLINICIAN:  CHRISTY BUSTILLOS      FINDINGS:  No consolidation. Diffuse reticular interstitial pulmonary opacities  suggestive of fibrotic change. No pleural effusion or pneumothorax.  Normal heart size. No acute osseous abnormality. Atherosclerosis.  Multilevel degenerative change in the spine.      Impression: No acute cardiopulmonary abnormality.      Diffuse reticular interstitial pulmonary opacities suggestive of  fibrotic change.      Signed by: Edilberto Yoon 11/28/2023 1:09 PM  Dictation workstation:   [id number]

## 2023-11-29 LAB
ANION GAP SERPL CALC-SCNC: 10 MMOL/L (ref 10–20)
BUN SERPL-MCNC: 30 MG/DL (ref 6–23)
CALCIUM SERPL-MCNC: 8.9 MG/DL (ref 8.6–10.3)
CHLORIDE SERPL-SCNC: 104 MMOL/L (ref 98–107)
CO2 SERPL-SCNC: 27 MMOL/L (ref 21–32)
CREAT SERPL-MCNC: 1.36 MG/DL (ref 0.5–1.05)
ERYTHROCYTE [DISTWIDTH] IN BLOOD BY AUTOMATED COUNT: 14.6 % (ref 11.5–14.5)
FERRITIN SERPL-MCNC: 172 NG/ML (ref 8–150)
GFR SERPL CREATININE-BSD FRML MDRD: 39 ML/MIN/1.73M*2
GLUCOSE BLD MANUAL STRIP-MCNC: 139 MG/DL (ref 74–99)
GLUCOSE SERPL-MCNC: 89 MG/DL (ref 74–99)
HCT VFR BLD AUTO: 27.7 % (ref 36–46)
HGB BLD-MCNC: 9.3 G/DL (ref 12–16)
HOLD SPECIMEN: NORMAL
MAGNESIUM SERPL-MCNC: 2.03 MG/DL (ref 1.6–2.4)
MCH RBC QN AUTO: 43.1 PG (ref 26–34)
MCHC RBC AUTO-ENTMCNC: 33.6 G/DL (ref 32–36)
MCV RBC AUTO: 128 FL (ref 80–100)
NRBC BLD-RTO: 0 /100 WBCS (ref 0–0)
PLATELET # BLD AUTO: 187 X10*3/UL (ref 150–450)
POTASSIUM SERPL-SCNC: 4.2 MMOL/L (ref 3.5–5.3)
RBC # BLD AUTO: 2.16 X10*6/UL (ref 4–5.2)
SODIUM SERPL-SCNC: 137 MMOL/L (ref 136–145)
T4 FREE SERPL-MCNC: 0.91 NG/DL (ref 0.61–1.12)
WBC # BLD AUTO: 6.4 X10*3/UL (ref 4.4–11.3)

## 2023-11-29 PROCEDURE — 85027 COMPLETE CBC AUTOMATED: CPT | Performed by: INTERNAL MEDICINE

## 2023-11-29 PROCEDURE — 83735 ASSAY OF MAGNESIUM: CPT | Performed by: INTERNAL MEDICINE

## 2023-11-29 PROCEDURE — 2500000001 HC RX 250 WO HCPCS SELF ADMINISTERED DRUGS (ALT 637 FOR MEDICARE OP): Performed by: INTERNAL MEDICINE

## 2023-11-29 PROCEDURE — 84439 ASSAY OF FREE THYROXINE: CPT | Performed by: INTERNAL MEDICINE

## 2023-11-29 PROCEDURE — 36415 COLL VENOUS BLD VENIPUNCTURE: CPT | Performed by: INTERNAL MEDICINE

## 2023-11-29 PROCEDURE — 80048 BASIC METABOLIC PNL TOTAL CA: CPT | Performed by: INTERNAL MEDICINE

## 2023-11-29 PROCEDURE — 97165 OT EVAL LOW COMPLEX 30 MIN: CPT | Mod: GO

## 2023-11-29 PROCEDURE — G0378 HOSPITAL OBSERVATION PER HR: HCPCS

## 2023-11-29 PROCEDURE — 97161 PT EVAL LOW COMPLEX 20 MIN: CPT | Mod: GP

## 2023-11-29 PROCEDURE — 82947 ASSAY GLUCOSE BLOOD QUANT: CPT

## 2023-11-29 PROCEDURE — 2500000004 HC RX 250 GENERAL PHARMACY W/ HCPCS (ALT 636 FOR OP/ED): Performed by: INTERNAL MEDICINE

## 2023-11-29 PROCEDURE — 99232 SBSQ HOSP IP/OBS MODERATE 35: CPT | Performed by: INTERNAL MEDICINE

## 2023-11-29 RX ORDER — ACETAMINOPHEN 650 MG/1
650 SUPPOSITORY RECTAL EVERY 4 HOURS PRN
Status: DISCONTINUED | OUTPATIENT
Start: 2023-11-29 | End: 2023-12-01 | Stop reason: HOSPADM

## 2023-11-29 RX ORDER — ACETAMINOPHEN 325 MG/1
650 TABLET ORAL EVERY 4 HOURS PRN
Status: DISCONTINUED | OUTPATIENT
Start: 2023-11-29 | End: 2023-12-01 | Stop reason: HOSPADM

## 2023-11-29 RX ORDER — ATORVASTATIN CALCIUM 10 MG/1
10 TABLET, FILM COATED ORAL NIGHTLY
Status: DISCONTINUED | OUTPATIENT
Start: 2023-11-29 | End: 2023-12-01 | Stop reason: HOSPADM

## 2023-11-29 RX ORDER — UBIDECARENONE 75 MG
1000 CAPSULE ORAL DAILY
Status: DISCONTINUED | OUTPATIENT
Start: 2023-11-29 | End: 2023-12-01 | Stop reason: HOSPADM

## 2023-11-29 RX ORDER — ACETAMINOPHEN 160 MG/5ML
650 SOLUTION ORAL EVERY 4 HOURS PRN
Status: DISCONTINUED | OUTPATIENT
Start: 2023-11-29 | End: 2023-12-01 | Stop reason: HOSPADM

## 2023-11-29 RX ORDER — ONDANSETRON HYDROCHLORIDE 2 MG/ML
4 INJECTION, SOLUTION INTRAVENOUS EVERY 6 HOURS PRN
Status: DISCONTINUED | OUTPATIENT
Start: 2023-11-29 | End: 2023-12-01 | Stop reason: HOSPADM

## 2023-11-29 RX ADMIN — SODIUM CHLORIDE, POTASSIUM CHLORIDE, SODIUM LACTATE AND CALCIUM CHLORIDE 75 ML/HR: 600; 310; 30; 20 INJECTION, SOLUTION INTRAVENOUS at 14:48

## 2023-11-29 RX ADMIN — ACETAMINOPHEN 650 MG: 325 TABLET ORAL at 21:49

## 2023-11-29 RX ADMIN — CEFTRIAXONE SODIUM 1 G: 1 INJECTION, SOLUTION INTRAVENOUS at 21:41

## 2023-11-29 RX ADMIN — CYANOCOBALAMIN TAB 500 MCG 1000 MCG: 500 TAB at 09:10

## 2023-11-29 RX ADMIN — ATORVASTATIN CALCIUM 10 MG: 10 TABLET, FILM COATED ORAL at 21:41

## 2023-11-29 RX ADMIN — ACETAMINOPHEN 650 MG: 325 TABLET ORAL at 11:47

## 2023-11-29 ASSESSMENT — PAIN SCALES - GENERAL
PAINLEVEL_OUTOF10: 3
PAINLEVEL_OUTOF10: 2

## 2023-11-29 ASSESSMENT — ACTIVITIES OF DAILY LIVING (ADL)
ADL_ASSISTANCE: INDEPENDENT
BATHING_ASSISTANCE: MODERATE
ADL_ASSISTANCE: INDEPENDENT

## 2023-11-29 ASSESSMENT — COGNITIVE AND FUNCTIONAL STATUS - GENERAL
HELP NEEDED FOR BATHING: A LOT
DAILY ACTIVITIY SCORE: 16
MOVING TO AND FROM BED TO CHAIR: A LOT
WALKING IN HOSPITAL ROOM: TOTAL
STANDING UP FROM CHAIR USING ARMS: A LOT
TURNING FROM BACK TO SIDE WHILE IN FLAT BAD: A LOT
MOBILITY SCORE: 10
DRESSING REGULAR UPPER BODY CLOTHING: A LITTLE
CLIMB 3 TO 5 STEPS WITH RAILING: TOTAL
PERSONAL GROOMING: A LITTLE
DRESSING REGULAR LOWER BODY CLOTHING: A LOT
MOVING FROM LYING ON BACK TO SITTING ON SIDE OF FLAT BED WITH BEDRAILS: A LOT
TOILETING: A LOT

## 2023-11-29 ASSESSMENT — PAIN - FUNCTIONAL ASSESSMENT
PAIN_FUNCTIONAL_ASSESSMENT: 0-10
PAIN_FUNCTIONAL_ASSESSMENT: 0-10

## 2023-11-29 ASSESSMENT — PAIN DESCRIPTION - LOCATION: LOCATION: LEG

## 2023-11-29 NOTE — PROGRESS NOTES
Physical Therapy    Physical Therapy Evaluation    Patient Name: Kristi Lucio  MRN: [id number]  Today's Date: 11/29/2023   Time Calculation  Start Time: 0935  Stop Time: 0955  Time Calculation (min): 20 min    Assessment/Plan   PT Assessment  PT Assessment Results: Decreased strength, Decreased endurance, Impaired balance, Decreased mobility, Decreased coordination  Rehab Prognosis: Fair  Evaluation/Treatment Tolerance: Patient limited by fatigue, Patient limited by pain  End of Session Communication: Bedside nurse, Care Coordinator  End of Session Patient Position: Bed, 3 rail up, Alarm on  IP OR SWING BED PT PLAN  Inpatient or Swing Bed: Inpatient  PT Plan  Treatment/Interventions: Bed mobility, Transfer training, Balance training, Strengthening, Endurance training, Therapeutic exercise, Therapeutic activity, Home exercise program  PT Plan: Skilled PT  PT Frequency: 3 times per week  PT Discharge Recommendations: Moderate intensity level of continued care  PT Recommended Transfer Status: Assist x1  PT - OK to Discharge: Yes (once medically/physically ready for safe transfers  to next level of care.)    Subjective     Current Problem:  Patient Active Problem List   Diagnosis    Accident due to mechanical fall without injury, initial encounter       General Visit Information:  General  Reason for Referral: weakness/ impaired mobility  Referred By: Jacqueline OT/PT 11/28  Past Medical History Relevant to Rehab: anxiety HLD, HTN  Family/Caregiver Present: No  Prior to Session Communication: Bedside nurse (cleared to see for therapy eval)  Patient Position Received: Bed, 3 rail up, Alarm on  General Comment: pt.is 84 y/o female to ED with low back pain and ankle pain s/p falling out of bed when attempting to transfer to W/C. Pt. laid on floor until son was able to get to her. CXR: pulmonary opacities, XR R ankle: (-), CT lumbar spine: scoliosis and mild spondyloisthesis, CT head/Cspine: remote fx andterior arch of C1, s/p  suboccipital cranieotomy defect    Home Living:  Home Living  Type of Home: House  Lives With: Alone  Home Adaptive Equipment: Wheelchair-manual, Cane, Walker rolling or standard  Home Layout: One level  Home Access: Stairs to enter with rails  Entrance Stairs-Number of Steps: 2  Bathroom Shower/Tub: Walk-in shower  Bathroom Toilet: Standard  Bathroom Equipment: Shower chair with back, Grab bars in shower  Bathroom Accessibility: W/C fits into bathroom per pt. report  Home Living Comments: Laundry on main floor. pt. states that she is in the process of moving to another house with better w/c accessability without stairs, however house is not finished yet.    Prior Level of Function:  Prior Function Per Pt/Caregiver Report  Level of [place]: Independent with ADLs and functional transfers, Independent with homemaking with ambulation  ADL Assistance: Independent  Homemaking Assistance: Independent  Ambulatory Assistance:  (uses W/C for mobility inside and outside. States that she hasn't walked in ~6 months)  Prior Function Comments: 1 fall. Does not drive. Son drives.    Precautions:  Precautions  Medical Precautions: Fall precautions    Objective     Pain:  Pain Assessment  Pain Assessment: 0-10  Pain Score:  (5/10 leg pain, 8/10 headache)  Pain Type: Acute pain    Cognition:  Cognition  Overall Cognitive Status: Within Functional Limits  General Assessments:      Activity Tolerance  Endurance: Decreased tolerance for upright activites     Strength  Strength Comments: BLE 3+/5    Dynamic Sitting Balance  Dynamic Sitting-Comments: fair -  Dynamic Standing Balance  Dynamic Standing-Comments: poor+    Functional Assessments:     Bed Mobility  Bed Mobility: Yes  Bed Mobility 1  Bed Mobility 1: Supine to sitting, Sitting to supine  Level of Assistance 1: Minimum assistance  Transfers  Transfer: Yes  Transfer 1  Technique 1: Stand pivot, Sit to stand, Stand to sit  Transfer Device 1:  (NO AD  HHA / MOD A)  Transfer  Level of Assistance 1: Moderate assistance  Trials/Comments 1: Assist to lift, steady, and balance throughout pivot transfer. Pt. requires VCs for safe hand placement and optimal set up of placement of BSC. Pt. states that she transnfrs to R side when at home. BSC placed on R side, however pt. attempted to move BSC directly in front of her. Mod A from bed to BSC and BSC to bed.  Ambulation/Gait Training  Ambulation/Gait Training Performed: No    Extremity/Trunk Assessments:  RLE   RLE : Within Functional Limits  LLE   LLE : Within Functional Limits  Outcome Measures:  [facility] Basic Mobility  Turning from your back to your side while in a flat bed without using bedrails: A lot  Moving from lying on your back to sitting on the side of a flat bed without using bedrails: A lot  Moving to and from bed to chair (including a wheelchair): A lot  Standing up from a chair using your arms (e.g. wheelchair or bedside chair): A lot  To walk in hospital room: Total  Climbing 3-5 steps with railing: Total  Basic Mobility - Total Score: 10  Goals:  Encounter Problems       Encounter Problems (Active)       PT Problem       Pt will demonstrate cga with bed mobility to edge of bed.         Start:  11/29/23    Expected End:  12/13/23            Pt will demonstrate cga with sit to stand/pivot transfers to chair with FWW for safety .         Start:  11/29/23    Expected End:  12/13/23            Pt to demo improved BLE strength by being able to complete supine/seated thera ex 2x20 BLEs with 4 or less rest breaks .         Start:  11/29/23    Expected End:  12/13/23                 Education Documentation  Mobility Training, taught by Rodolfo Jones, PT at 11/29/2023 12:58 PM.  Learner: Patient  Readiness: Acceptance  Method: Explanation  Response: Verbalizes Understanding    Education Comments  No comments found.

## 2023-11-29 NOTE — PROGRESS NOTES
11/29/23 1056   Discharge Planning   Living Arrangements Alone   Support Systems Children   Assistance Needed independent   Type of Residence Private residence   Does the patient need discharge transport arranged? Yes   RoundTrip coordination needed? Yes   Has discharge transport been arranged? No   Patient Choice   Provider Choice list and CMS website (https://medicare.gov/care-compare#search) for post-acute Quality and Resource Measure Data were provided and reviewed with: Patient     Writer spoke with patient- introduced self and explained role. Patient sitting up in bed. She is alert and oriented x3. Prior to admission, lives alone and prior level of functioning independent. She has a wheeled walker, cane and wheelchair. She completed own adls and son assist with iadls. She does not drive. Son assist with transportation, obtaining groceries, medications. She resides in [place] was visiting in the area. She has pcp in [place] with [facility] Dr. Edwards. Writer discussed discharge needs/ concerns. She is in agreement with need SNF and prefers to stay in the [facility] where her family can visit. List from [facility] provided. Patient will be a [facility]. Care Transition team to continue to follow and assist as needed.  KEITH Sal  Addendum 1445 Writer followed up with patient on preference. Her preference is the [facility]. Referral sent in [facility]. Per Dr. Phillips medically ready for discharge.  KEITH Sal

## 2023-11-29 NOTE — PROGRESS NOTES
ASSESSMENT & PLAN:     Mechanical fall  Generalized weakness  Chronic dizziness  Chronic cervical spinal DJD  Chronic lumbar spinal DJD  R ankle pain  - imaging in ED neg for acute findings  - CK wnl  - no fnd on exam, largely benign exam  Plan:  - PT/OT for possible placement  - pain control  - gentle IVF hydration, will cont today  - monitor on tele  - PRN meclizine    Acute cystitis  - symptoms of increased frequency, UA showed mod LE, 4+ bacteria  - cont empiric CTX, fu Ucx results     CKD3  - at baseline, monitor  - on gentle IVF as well     Macrocytic anemia  B12 deficiency  - Hgb 10.9 on admission, no recent baseline  - Hgb 9.3 today, likely dilutional drop, no obvious s/s bleeding  - anemia labs showed B12 deficiency  - started on B12 supplementation    Subclinical hypothyroidism  - TSH 4.84, FT4 0.91  - consider repeat as outpt in 1-2 months     HTN  DLD  - home meds as appropriate, holding home ARB-hydrochlorothiazide combo for now, BP controlled     Vte ppx sqh  Pt/ot ordered  DNR/DNI    Roger Phillips MD    SUBJECTIVE     NAEON. Having some minor HA this morning. Says she had episode of vomiting this morning but no nausea, feels it is due to her headache. Tylenol usually helps. Worked with PT. Willing to go to SNF.       OBJECTIVE:       Last Recorded Vitals:  Vitals:    11/28/23 2015 11/28/23 2241 11/29/23 0105 11/29/23 0737   BP: (!) 187/82 (!) 187/82 116/54 127/60   BP Location:       Patient Position:    Sitting   Pulse: 86 86 77 75   Resp: 20 20 17 14   Temp: 36.3 °C (97.3 °F) 36.3 °C (97.3 °F) 36.4 °C (97.5 °F) 36.3 °C (97.3 °F)   TempSrc:  Temporal     SpO2: 98% 98% 95% 98%   Weight: 59.3 kg (130 lb 11.7 oz) 59.3 kg (130 lb 11.7 oz)     Height:  1.524 m (5')         Last I/O:  I/O last 3 completed shifts:  In: 553.8 (9.3 mL/kg) [I.V.:503.8 (8.5 mL/kg); IV Piggyback:50]  Out: - (0 mL/kg)   Weight: 59.3 kg     Physical Exam:  GEN: healthy appearing, appears stated age, NAD  HEENT: NCAT  NECK:  supple, no masses  CV: RRR, no m/r/g, trace LE edema  LUNGS: CTAB  ABD: soft, NT  SKIN: no rashes  MSK; no gross deformities, normal joints  NEURO: A+Ox3, no FND, generalized weakness 4/5 in all extremities  PSYCH: appropriate mood, affect    Inpatient Medications:  atorvastatin, 10 mg, oral, Nightly  cefTRIAXone, 1 g, intravenous, q24h  cyanocobalamin, 1,000 mcg, oral, Daily  heparin (porcine), 5,000 Units, subcutaneous, q8h        PRN Medications  PRN medications: acetaminophen **OR** acetaminophen **OR** acetaminophen, meclizine, ondansetron, polyethylene glycol, traMADol    Continuous Medications:  lactated Ringer's, 75 mL/hr, Last Rate: 75 mL/hr (11/29/23 0518)          LABS AND IMAGING:     Labs:  Results for orders placed or performed during the hospital encounter of 11/28/23 (from the past 24 hour(s))   Sars-CoV-2 and Influenza A/B PCR   Result Value Ref Range    Flu A Result Not Detected Not Detected    Flu B Result Not Detected Not Detected    Coronavirus 2019, PCR Not Detected Not Detected   CBC and Auto Differential   Result Value Ref Range    WBC 10.0 4.4 - 11.3 x10*3/uL    nRBC 0.0 0.0 - 0.0 /100 WBCs    RBC 2.52 (L) 4.00 - 5.20 x10*6/uL    Hemoglobin 10.9 (L) 12.0 - 16.0 g/dL    Hematocrit 31.3 (L) 36.0 - 46.0 %     (H) 80 - 100 fL    MCH 43.3 (H) 26.0 - 34.0 pg    MCHC 34.8 32.0 - 36.0 g/dL    RDW 14.3 11.5 - 14.5 %    Platelets 244 150 - 450 x10*3/uL    Neutrophils % 70.0 40.0 - 80.0 %    Immature Granulocytes %, Automated 0.2 0.0 - 0.9 %    Lymphocytes % 15.8 13.0 - 44.0 %    Monocytes % 13.3 2.0 - 10.0 %    Eosinophils % 0.4 0.0 - 6.0 %    Basophils % 0.3 0.0 - 2.0 %    Neutrophils Absolute 7.01 (H) 1.60 - 5.50 x10*3/uL    Immature Granulocytes Absolute, Automated 0.02 0.00 - 0.50 x10*3/uL    Lymphocytes Absolute 1.58 0.80 - 3.00 x10*3/uL    Monocytes Absolute 1.33 (H) 0.05 - 0.80 x10*3/uL    Eosinophils Absolute 0.04 0.00 - 0.40 x10*3/uL    Basophils Absolute 0.03 0.00 - 0.10 x10*3/uL    Comprehensive Metabolic Panel   Result Value Ref Range    Glucose 103 (H) 74 - 99 mg/dL    Sodium 138 136 - 145 mmol/L    Potassium 4.3 3.5 - 5.3 mmol/L    Chloride 104 98 - 107 mmol/L    Bicarbonate 25 21 - 32 mmol/L    Anion Gap 13 10 - 20 mmol/L    Urea Nitrogen 30 (H) 6 - 23 mg/dL    Creatinine 1.22 (H) 0.50 - 1.05 mg/dL    eGFR 44 (L) >60 mL/min/1.73m*2    Calcium 9.5 8.6 - 10.3 mg/dL    Albumin 4.1 3.4 - 5.0 g/dL    Alkaline Phosphatase 56 33 - 136 U/L    Total Protein 7.9 6.4 - 8.2 g/dL    AST 26 9 - 39 U/L    Bilirubin, Total 0.8 0.0 - 1.2 mg/dL    ALT 13 7 - 45 U/L   Magnesium   Result Value Ref Range    Magnesium 1.92 1.60 - 2.40 mg/dL   Lactate   Result Value Ref Range    Lactate 1.3 0.4 - 2.0 mmol/L   Protime-INR   Result Value Ref Range    Protime 12.5 9.8 - 12.8 seconds    INR 1.1 0.9 - 1.1   B-Type Natriuretic Peptide   Result Value Ref Range    BNP 94 0 - 99 pg/mL   Creatine Kinase   Result Value Ref Range    Creatine Kinase 123 0 - 215 U/L   Troponin I, High Sensitivity, Initial   Result Value Ref Range    Troponin I, High Sensitivity 24 (H) 0 - 13 ng/L   Pathologist Review-CBC Differential   Result Value Ref Range    Pathologist Review-CBC Differential       Macrocytosis and hypersegmented neutrophils suggestive of Folate or B12 deficiency.   Morphology   Result Value Ref Range    RBC Morphology See Below     Ovalocytes Few    Reticulocytes   Result Value Ref Range    Retic % 2.1 (H) 0.5 - 2.0 %    Retic Absolute 0.052 0.017 - 0.110 x10*6/uL    Reticulocyte Hemoglobin 36 28 - 38 pg    Immature Retic fraction 14.8 <=16.0 %   Troponin, High Sensitivity, 1 Hour   Result Value Ref Range    Troponin I, High Sensitivity 23 (H) 0 - 13 ng/L   TSH   Result Value Ref Range    Thyroid Stimulating Hormone 4.84 (H) 0.44 - 3.98 mIU/L   Ferritin   Result Value Ref Range    Ferritin 172 (H) 8 - 150 ng/mL   Iron and TIBC   Result Value Ref Range    Iron 46 35 - 150 ug/dL    UIBC 267 110 - 370 ug/dL    TIBC 313  240 - 445 ug/dL    % Saturation 15 (L) 25 - 45 %   Folate   Result Value Ref Range    Folate, Serum >22.3 >5.0 ng/mL   Vitamin B12   Result Value Ref Range    Vitamin B12 54 (L) 211 - 911 pg/mL   Urinalysis with Reflex Microscopic and Culture   Result Value Ref Range    Color, Urine Yellow Straw, Yellow    Appearance, Urine Clear Clear    Specific Gravity, Urine 1.020 1.005 - 1.035    pH, Urine 5.0 5.0, 5.5, 6.0, 6.5, 7.0, 7.5, 8.0    Protein, Urine 30 (1+) (N) NEGATIVE mg/dL    Glucose, Urine NEGATIVE NEGATIVE mg/dL    Blood, Urine NEGATIVE NEGATIVE    Ketones, Urine 20 (1+) (A) NEGATIVE mg/dL    Bilirubin, Urine NEGATIVE NEGATIVE    Urobilinogen, Urine <2.0 <2.0 mg/dL    Nitrite, Urine NEGATIVE NEGATIVE    Leukocyte Esterase, Urine MODERATE (2+) (A) NEGATIVE   Extra Urine Gray Tube   Result Value Ref Range    Extra Tube Hold for add-ons.    Microscopic Only, Urine   Result Value Ref Range    WBC, Urine 21-50 (A) 1-5, NONE /HPF    RBC, Urine 1-2 NONE, 1-2, 3-5 /HPF    Bacteria, Urine 4+ (A) NONE SEEN /HPF    Mucus, Urine 3+ Reference range not established. /LPF   SST TOP   Result Value Ref Range    Extra Tube Hold for add-ons.    Magnesium   Result Value Ref Range    Magnesium 2.03 1.60 - 2.40 mg/dL   Basic Metabolic Panel   Result Value Ref Range    Glucose 89 74 - 99 mg/dL    Sodium 137 136 - 145 mmol/L    Potassium 4.2 3.5 - 5.3 mmol/L    Chloride 104 98 - 107 mmol/L    Bicarbonate 27 21 - 32 mmol/L    Anion Gap 10 10 - 20 mmol/L    Urea Nitrogen 30 (H) 6 - 23 mg/dL    Creatinine 1.36 (H) 0.50 - 1.05 mg/dL    eGFR 39 (L) >60 mL/min/1.73m*2    Calcium 8.9 8.6 - 10.3 mg/dL   CBC   Result Value Ref Range    WBC 6.4 4.4 - 11.3 x10*3/uL    nRBC 0.0 0.0 - 0.0 /100 WBCs    RBC 2.16 (L) 4.00 - 5.20 x10*6/uL    Hemoglobin 9.3 (L) 12.0 - 16.0 g/dL    Hematocrit 27.7 (L) 36.0 - 46.0 %     (H) 80 - 100 fL    MCH 43.1 (H) 26.0 - 34.0 pg    MCHC 33.6 32.0 - 36.0 g/dL    RDW 14.6 (H) 11.5 - 14.5 %    Platelets 187 150 -  450 x10*3/uL   T4, free   Result Value Ref Range    Thyroxine, Free 0.91 0.61 - 1.12 ng/dL        Imaging:  CT lumbar spine wo IV contrast  Narrative: Interpreted By:  Kimi Louis,   STUDY:  CT LUMBAR SPINE WO IV CONTRAST  11/28/2023 1:39 pm      INDICATION:  Signs/Symptoms:Fall, pain      COMPARISON:  None.      ACCESSION NUMBER(S):  [id number]      ORDERING CLINICIAN:  CHRISTY BUSTILLOS      TECHNIQUE:  Contiguous axial CT images were obtained through the  lumbar spine  without contrast administration. Sagittal and coronal reconstructions  were generated.      FINDINGS:          ALIGNMENT:  Moderate levocurvature centered at approximately L2. Grade 1  retrolisthesis at L3-4. Grade 1 anterolisthesis at L5-S1.      VERTEBRAE/DISC SPACES:  No lumbar vertebral compression deformity or acute displaced  fracture. Diffuse intervertebral disc space narrowing and multilevel  vacuum discs. Diffuse endplate sclerosis and spurring. Bilateral  facet joint degenerative changes distally with adjacent smooth  calcifications/ossifications.      ADDITIONAL FINDINGS:  Smooth expansile possible remote fracture deformity of the left 12th  rib. Rounded isodense presumed splenule in the left upper quadrant.  Multiple calcified gallstones. Bilateral renal pelviectasis. Distal  colon diverticulosis. Dilated fecal filled rectum measuring 5.3 cm  transversely.      Impression: No lumbar vertebral compression deformity or acute displaced fracture.      Scoliosis with multilevel degenerative changes and mild  spondylolisthesis.      Paraspinal/soft tissue findings as above including rectal fecal  retention and cholelithiasis.      MACRO:  None.      Signed by: Kimi Louis 11/28/2023 2:06 PM  Dictation workstation:   [id number]  CT cervical spine wo IV contrast  Narrative: Interpreted By:  Kimi Louis,   STUDY:  CT CERVICAL SPINE WO IV CONTRAST;  11/28/2023 1:36 pm      INDICATION:  Signs/Symptoms:Fall.      COMPARISON:  None.       ACCESSION NUMBER(S):  [id number]      ORDERING CLINICIAN:  CHRISTY BUSTILLOS      TECHNIQUE:  CT images were obtained through the cervical spine. Sagittal and  coronal reconstructions were generated.      FINDINGS:          ALIGNMENT:  Grade 1 anterolisthesis at C6-7. Straightening of the midcervical  lordosis.      VERTEBRAE/DISC SPACES:  No compression deformity or acute displaced fracture. Absence of the  posterior ring of C1. Tiny defect of the anterior arch of C1 with  smooth margins. Atlantoaxial joint space narrowing with  calcifications and spurring. C5-6 and C6-7 intervertebral disc space  narrowing with endplate sclerosis and spurring. Tiny vacuum disc at  C6-7. Multilevel bilateral facet joint narrowing and spurring.      ADDITIONAL FINDINGS:  Suboccipital craniectomy defect. No abnormal thickening of the  prevertebral soft tissues. Reticular densities in the included  bilateral lung apices.      Impression: No cervical vertebral compression deformity or acute displaced  fracture. Tiny smooth possible remote fracture defect in the anterior  arch of C1 and probable surgical absence of the posterior arch of C1.  Patient is also status post suboccipital craniectomy.      Multilevel degenerative changes with mild spondylolisthesis.      Reticular densities in both lung apices, differential includes  pulmonary fibrosis.      MACRO:  None.      Signed by: Kimi Louis 11/28/2023 2:00 PM  Dictation workstation:   [id number]  CT head wo IV contrast  Narrative: Interpreted By:  Kimi Louis,   STUDY:  CT HEAD WO IV CONTRAST;  11/28/2023 1:36 pm      INDICATION:  Fall.      COMPARISON:  None.      ACCESSION NUMBER(S):  [id number]      ORDERING CLINICIAN:  CHRISTY BUSTILLOS      TECHNIQUE:  Unenhanced CT images of the head were obtained.      FINDINGS:  Diffuse cerebral atrophy with enlargement of the extra-axial spaces,  cerebral sulci and ventricular system. Faint symmetric likely  physiologic basal ganglia  calcifications. Periventricular white  matter hypodensities bilaterally consistent with small vessel  ischemic disease. Protrusion of meninges and CSF attenuation through  suboccipital craniectomy defect. No acute intracranial hemorrhage or  mass-effect. No midline shift. No extra-axial fluid collection.      No acute displaced calvarial fracture.      Visualized paranasal sinuses are clear.      Impression: No acute intracranial hemorrhage or mass-effect.      Protrusion of meninges and CSF through suboccipital craniectomy  defect.      MACRO:  None.      Signed by: Kimi Louis 11/28/2023 1:55 PM  Dictation workstation:   [id number]  XR ankle right 3+ views  Narrative: Interpreted By:  Yohannes Velasquez,   STUDY:  XR ANKLE RIGHT 3+ VIEWS; 11/28/2023 1:11 pm      INDICATION:  Signs/Symptoms:Pain.      COMPARISON:  None.      ACCESSION NUMBER(S):  [id number]      ORDERING CLINICIAN:  CHRISTY BUSTILLOS      TECHNIQUE:  Three views of the right ankle including AP , oblique and lateral  projections were obtained.      FINDINGS:  There is no evidence of acute fracture or dislocation identified. The  joint spaces are well preserved without significant degenerative  changes. No significant soft tissue swelling is seen.      Impression: 1. No evidence of acute fracture or dislocation.      MACRO:  None.      Signed by: Yohannes Velasquez 11/28/2023 1:31 PM  Dictation workstation:   [id number]  XR chest 1 view  Narrative: Interpreted By:  Edilberto Yoon,   STUDY:  XR CHEST 1 VIEW;  11/28/2023 12:59 pm      INDICATION:  Signs/Symptoms:Fall.      COMPARISON:  None.      ACCESSION NUMBER(S):  [id number]      ORDERING CLINICIAN:  CHRISTY BUSTILLOS      FINDINGS:  No consolidation. Diffuse reticular interstitial pulmonary opacities  suggestive of fibrotic change. No pleural effusion or pneumothorax.  Normal heart size. No acute osseous abnormality. Atherosclerosis.  Multilevel degenerative change in the spine.      Impression: No acute  cardiopulmonary abnormality.      Diffuse reticular interstitial pulmonary opacities suggestive of  fibrotic change.      Signed by: Edilberto Yoon 11/28/2023 1:09 PM  Dictation workstation:   [id number]

## 2023-11-29 NOTE — PROGRESS NOTES
Occupational Therapy    Evaluation    Patient Name: Kristi Lucio  MRN: [id number]  Today's Date: 11/29/2023  Time Calculation  Start Time: 0934  Stop Time: 0956  Time Calculation (min): 22 min        Assessment:  OT Assessment: pt. presents with weakness and decreased endurance, requires assist for all ADLs and transfers at this time.  Prognosis: Good  Barriers to Discharge: Decreased caregiver support, Inaccessible home environment  Evaluation/Treatment Tolerance: Patient tolerated treatment well  Medical Staff Made Aware: Yes  End of Session Communication: Bedside nurse  End of Session Patient Position: Bed, 3 rail up, Alarm on  OT Assessment Results: Decreased ADL status, Decreased safe judgment during ADL, Decreased endurance, Decreased IADLs  Prognosis: Good  Barriers to Discharge: Decreased caregiver support, Inaccessible home environment  Evaluation/Treatment Tolerance: Patient tolerated treatment well  Medical Staff Made Aware: Yes  Strengths: Attitude of self  Plan:  Treatment Interventions: ADL retraining, Functional transfer training, UE strengthening/ROM  OT Frequency: 2 times per week  OT Discharge Recommendations: Moderate intensity level of continued care  OT - OK to Discharge: Yes (when medically stable to moderate intensity OT level of care.)  Treatment Interventions: ADL retraining, Functional transfer training, UE strengthening/ROM    Subjective   Current Problem:  1. Generalized weakness        2. Unable to ambulate        3. Anemia, unspecified type        4. Acute kidney injury (CMS/HCC)          General:  General  Reason for Referral: ADL impairment  Referred By: Jacqueline OT/PT 11/28  Past Medical History Relevant to Rehab: anxiety HLD, HTN  Family/Caregiver Present: No  Prior to Session Communication: Bedside nurse  Patient Position Received: Bed, 3 rail up, Alarm on  General Comment: pt.is 82 y/o female to ED with low back pain and ankle pain s/p falling out of bed when attempting to transfer  to W/C. Pt. laid on floor until son was able to get to her. CXR: pulmonary opacities, XR R ankle: (-), CT lumbar spine: scoliosis and mild spondyloisthesis, CT head/Cspine: remote fx andterior arch of C1, s/p suboccipital cranieotomy defect  Precautions:  Medical Precautions: Fall precautions       Pain:  Pain Assessment  Pain Assessment: 0-10  Pain Score:  (5/10 leg pain, 8/10 headache)  Pain Type: Acute pain    Objective   Cognition:  Overall Cognitive Status: Within Functional Limits           Home Living:  Type of Home: House  Lives With: Alone  Home Adaptive Equipment: Wheelchair-manual, Cane, Walker rolling or standard  Home Layout: One level  Home Access: Stairs to enter with rails  Entrance Stairs-Number of Steps: 2  Bathroom Shower/Tub: Walk-in shower  Bathroom Toilet: Standard  Bathroom Equipment: Shower chair with back, Grab bars in shower  Bathroom Accessibility: W/C fits into bathroom per pt. report  Home Living Comments: Laundry on main floor. pt. states that she is in the process of moving to another house with better w/c accessability without stairs, however house is not finished yet.  Prior Function:  Level of [place]: Independent with ADLs and functional transfers, Independent with homemaking with ambulation  ADL Assistance: Independent  Homemaking Assistance: Independent  Ambulatory Assistance:  (uses W/C for mobility inside and outside. States that she hasn't walked in ~6 months)  Prior Function Comments: 1 fall. Does not drive. Son drives.  IADL History:  IADL Comments: Pt. states that she was able to manage IADLs.  ADL:  Eating Assistance: Independent  Grooming Assistance: Stand by (set up ; sitting)  Bathing Assistance: Moderate  UE Dressing Assistance: Minimal  LE Dressing Assistance: Maximal  Toileting Assistance with Device: Moderate  Activity Tolerance:  Endurance: Decreased tolerance for upright activites  Bed Mobility/Transfers: Bed Mobility  Bed Mobility: Yes  Bed Mobility 1  Bed  Mobility 1: Supine to sitting, Sitting to supine  Level of Assistance 1: Minimum assistance    Transfers  Transfer: Yes  Transfer 1  Transfer From 1:  (transfer to and from BSC)  Technique 1: Stand pivot  Transfer Device 1:  (No AD)  Transfer Level of Assistance 1: Moderate assistance  Trials/Comments 1: Assist to lift, steady, and balance throughout pivot transfer. Pt. requires VCs for safe hand placement and optimal set up of placement of BSC. Pt. states that she transnfrs to R side when at home. BSC placed on R side, however pt. attempted to move BSC directly in front of her. Mod A from bed to BSC and BSC to bed.      Ambulation/Gait Training:  Ambulation/Gait Training  Ambulation/Gait Training Performed:  (non ambulatory at baseline)  Sitting Balance:  Static Sitting Balance  Static Sitting-Level of Assistance: Close supervision  Standing Balance:  Dynamic Standing Balance  Dynamic Standing-Comments: poor        Strength:  Strength Comments: RUE 3+/5 MMT, LUE 4/5 MMT        Hand Function:  Gross Grasp: Functional  Extremities: RUE   RUE : Within Functional Limits and LUE   LUE: Within Functional Limits      Outcome Measures:[facility] Daily Activity  Putting on and taking off regular lower body clothing: A lot  Bathing (including washing, rinsing, drying): A lot  Putting on and taking off regular upper body clothing: A little  Toileting, which includes using toilet, bedpan or urinal: A lot  Taking care of personal grooming such as brushing teeth: A little  Eating Meals: None  Daily Activity - Total Score: 16        Education Documentation  ADL Training, taught by Janette Farnsworth, OT at 11/29/2023 12:23 PM.  Learner: Patient  Readiness: Acceptance  Method: Explanation  Response: Needs Reinforcement    Education Comments  No comments found.        IP EDUCATION:  Education  Individual(s) Educated: Patient    Goals:  Encounter Problems       Encounter Problems (Active)       OT Goals       CGA for functional  pivot transfers  (Progressing)       Start:  11/29/23    Expected End:  12/13/23            Indep carryover of BUE therapeutic exercise HEP to progress UE MMT for ADLs and transfers  (Progressing)       Start:  11/29/23    Expected End:  12/13/23            CGA for LB dressing with AE as needed  (Progressing)       Start:  11/29/23    Expected End:  12/13/23

## 2023-11-30 VITALS
BODY MASS INDEX: 25.67 KG/M2 | HEART RATE: 68 BPM | OXYGEN SATURATION: 96 % | HEIGHT: 60 IN | SYSTOLIC BLOOD PRESSURE: 164 MMHG | TEMPERATURE: 99.5 F | WEIGHT: 130.73 LBS | DIASTOLIC BLOOD PRESSURE: 70 MMHG | RESPIRATION RATE: 13 BRPM

## 2023-11-30 LAB
ANION GAP SERPL CALC-SCNC: 10 MMOL/L (ref 10–20)
BUN SERPL-MCNC: 28 MG/DL (ref 6–23)
CALCIUM SERPL-MCNC: 8.9 MG/DL (ref 8.6–10.3)
CHLORIDE SERPL-SCNC: 103 MMOL/L (ref 98–107)
CO2 SERPL-SCNC: 28 MMOL/L (ref 21–32)
CREAT SERPL-MCNC: 1.2 MG/DL (ref 0.5–1.05)
ERYTHROCYTE [DISTWIDTH] IN BLOOD BY AUTOMATED COUNT: 14.6 % (ref 11.5–14.5)
GFR SERPL CREATININE-BSD FRML MDRD: 45 ML/MIN/1.73M*2
GLUCOSE SERPL-MCNC: 99 MG/DL (ref 74–99)
HCT VFR BLD AUTO: 30.2 % (ref 36–46)
HGB BLD-MCNC: 10.2 G/DL (ref 12–16)
HOLD SPECIMEN: NORMAL
MAGNESIUM SERPL-MCNC: 1.9 MG/DL (ref 1.6–2.4)
MCH RBC QN AUTO: 42.5 PG (ref 26–34)
MCHC RBC AUTO-ENTMCNC: 33.8 G/DL (ref 32–36)
MCV RBC AUTO: 126 FL (ref 80–100)
NRBC BLD-RTO: 0 /100 WBCS (ref 0–0)
PLATELET # BLD AUTO: 206 X10*3/UL (ref 150–450)
POTASSIUM SERPL-SCNC: 4.1 MMOL/L (ref 3.5–5.3)
RBC # BLD AUTO: 2.4 X10*6/UL (ref 4–5.2)
SODIUM SERPL-SCNC: 137 MMOL/L (ref 136–145)
WBC # BLD AUTO: 6.1 X10*3/UL (ref 4.4–11.3)

## 2023-11-30 PROCEDURE — 97110 THERAPEUTIC EXERCISES: CPT | Mod: GP,CQ

## 2023-11-30 PROCEDURE — 85027 COMPLETE CBC AUTOMATED: CPT | Performed by: INTERNAL MEDICINE

## 2023-11-30 PROCEDURE — 36415 COLL VENOUS BLD VENIPUNCTURE: CPT | Performed by: INTERNAL MEDICINE

## 2023-11-30 PROCEDURE — 96372 THER/PROPH/DIAG INJ SC/IM: CPT | Performed by: INTERNAL MEDICINE

## 2023-11-30 PROCEDURE — 2500000004 HC RX 250 GENERAL PHARMACY W/ HCPCS (ALT 636 FOR OP/ED): Performed by: INTERNAL MEDICINE

## 2023-11-30 PROCEDURE — 96361 HYDRATE IV INFUSION ADD-ON: CPT | Performed by: INTERNAL MEDICINE

## 2023-11-30 PROCEDURE — G0378 HOSPITAL OBSERVATION PER HR: HCPCS

## 2023-11-30 PROCEDURE — 2500000001 HC RX 250 WO HCPCS SELF ADMINISTERED DRUGS (ALT 637 FOR MEDICARE OP): Performed by: INTERNAL MEDICINE

## 2023-11-30 PROCEDURE — 83735 ASSAY OF MAGNESIUM: CPT | Performed by: INTERNAL MEDICINE

## 2023-11-30 PROCEDURE — 80048 BASIC METABOLIC PNL TOTAL CA: CPT | Performed by: INTERNAL MEDICINE

## 2023-11-30 PROCEDURE — 97530 THERAPEUTIC ACTIVITIES: CPT | Mod: GP,CQ

## 2023-11-30 PROCEDURE — 99239 HOSP IP/OBS DSCHRG MGMT >30: CPT | Performed by: INTERNAL MEDICINE

## 2023-11-30 RX ORDER — MECLIZINE HYDROCHLORIDE 25 MG/1
25 TABLET ORAL 3 TIMES DAILY PRN
Qty: 30 TABLET | Refills: 0
Start: 2023-11-30

## 2023-11-30 RX ORDER — LANOLIN ALCOHOL/MO/W.PET/CERES
1000 CREAM (GRAM) TOPICAL DAILY
Start: 2023-12-01

## 2023-11-30 RX ORDER — CEFDINIR 300 MG/1
300 CAPSULE ORAL 2 TIMES DAILY
Qty: 2 CAPSULE | Refills: 0
Start: 2023-11-30 | End: 2023-12-01

## 2023-11-30 RX ADMIN — ACETAMINOPHEN 650 MG: 325 TABLET ORAL at 17:30

## 2023-11-30 RX ADMIN — ACETAMINOPHEN 650 MG: 325 TABLET ORAL at 13:00

## 2023-11-30 RX ADMIN — ATORVASTATIN CALCIUM 10 MG: 10 TABLET, FILM COATED ORAL at 21:21

## 2023-11-30 RX ADMIN — CYANOCOBALAMIN TAB 500 MCG 1000 MCG: 500 TAB at 09:31

## 2023-11-30 RX ADMIN — SODIUM CHLORIDE, POTASSIUM CHLORIDE, SODIUM LACTATE AND CALCIUM CHLORIDE 75 ML/HR: 600; 310; 30; 20 INJECTION, SOLUTION INTRAVENOUS at 04:49

## 2023-11-30 RX ADMIN — HEPARIN SODIUM 5000 UNITS: 5000 INJECTION INTRAVENOUS; SUBCUTANEOUS at 21:21

## 2023-11-30 RX ADMIN — LOSARTAN POTASSIUM 12.5 MG: 100 TABLET, FILM COATED ORAL at 09:33

## 2023-11-30 RX ADMIN — HEPARIN SODIUM 5000 UNITS: 5000 INJECTION INTRAVENOUS; SUBCUTANEOUS at 13:00

## 2023-11-30 ASSESSMENT — COGNITIVE AND FUNCTIONAL STATUS - GENERAL
STANDING UP FROM CHAIR USING ARMS: A LOT
HELP NEEDED FOR BATHING: A LOT
MOVING TO AND FROM BED TO CHAIR: A LOT
TOILETING: A LOT
WALKING IN HOSPITAL ROOM: TOTAL
CLIMB 3 TO 5 STEPS WITH RAILING: TOTAL
MOBILITY SCORE: 10
DRESSING REGULAR LOWER BODY CLOTHING: A LITTLE
WALKING IN HOSPITAL ROOM: TOTAL
CLIMB 3 TO 5 STEPS WITH RAILING: TOTAL
TURNING FROM BACK TO SIDE WHILE IN FLAT BAD: A LITTLE
STANDING UP FROM CHAIR USING ARMS: A LOT
TURNING FROM BACK TO SIDE WHILE IN FLAT BAD: A LOT
MOBILITY SCORE: 13
DAILY ACTIVITIY SCORE: 19
MOVING FROM LYING ON BACK TO SITTING ON SIDE OF FLAT BED WITH BEDRAILS: A LOT
MOVING TO AND FROM BED TO CHAIR: A LOT

## 2023-11-30 ASSESSMENT — PAIN SCALES - GENERAL
PAINLEVEL_OUTOF10: 3
PAINLEVEL_OUTOF10: 0 - NO PAIN

## 2023-11-30 ASSESSMENT — PAIN - FUNCTIONAL ASSESSMENT
PAIN_FUNCTIONAL_ASSESSMENT: 0-10
PAIN_FUNCTIONAL_ASSESSMENT: 0-10

## 2023-11-30 NOTE — PROGRESS NOTES
Physical Therapy    Physical Therapy Treatment    Patient Name: Kristi Lucio  MRN: [id number]  Today's Date: 11/30/2023  Time Calculation  Start Time: 1052  Stop Time: 1120  Time Calculation (min): 28 min       Assessment/Plan   End of Session Communication: Bedside nurse, PCT/NA/YARELY  Assessment Comment: Call-light, phone, and traytable within reach.  End of Session Patient Position: Bed, 3 rail up, Alarm on  PT Plan  Inpatient/Swing Bed or Outpatient: Inpatient  Treatment/Interventions: Bed mobility, Transfer training, Balance training, Strengthening, Endurance training, Therapeutic exercise, Therapeutic activity, Home exercise program  PT Plan: Skilled PT  PT Frequency: 3 times per week  PT Discharge Recommendations: Moderate intensity level of continued care    PT Recommended Transfer Status: Assist x1    General Visit Information:   PT  Visit  PT Received On: 11/30/23  General  Family/Caregiver Present: No  Prior to Session Communication: Bedside nurse, PCT/NA/YARELY  Patient Position Received: Bed, 3 rail up, Alarm on  General Comment: Pleasant and cooperative.    General Observations:   General Observation: [place]; Tele.    Subjective     Precautions:  Precautions  Medical Precautions: Fall precautions     Objective     Pain:  Pain Assessment  Pain Score:  (Denies pain.)      Treatments:  Therapeutic Exercise  Therapeutic Exercise Performed: Yes  Therapeutic Exercise Activity 2: Instructed patient in seated ther ex. AROM BLE with AP/LAQ/Hipflex/Abd-add(with resistance) x12. (B)UE support to maintain sitting balance 2° patient fearful of falling. No c/o pain.        Bed Mobility  Bed Mobility: Yes  Bed Mobility 1  Bed Mobility 1: Supine to sitting, Sitting to supine  Level of Assistance 1: Moderate assistance  Bed Mobility Comments 1: HOB ~40° supine to sit and HOB flat sit to supine. Patient uses bed rails well for support. v/c for supine<-->side-lying<-->sit transition to/from EOB. (A) to lift UB from HOB while  moving LEs over the EOB.(A) to support UB while lifting LEs onto the bed. Patient c/o a little dizziness with positional changes, but feeling subsided with time. Rolling L/R(2x). Good use of bed rails for support. Mod(A) with use of bed pad to readjust positioning of hips. Increased fatigue reported with activities. Patient agreeable to having bed placed in chair position for lunch.     Transfers  Transfer: Yes  Transfer 1  Technique 1: Lateral  Transfer Level of Assistance 1: Moderate assistance  Trials/Comments 1: Patient declined transfer to/from [facility] to simulate w/c transfer at home 2° fearful of falling. Agreeable to practicing wt shifting EOB in preparation for pivot transfers. Instructed patient in press-ups 4x to shift hips toward HOB. Foot stool under LEs 2° feet not touching the floor when sitting EOB. Bed pad used to increase movement towards HOB when patient pushed through UEs. Increased fatigue reported with exertion. Patient agreeable to have bed placed in chair position since she was not comfortable with OOB transfer this session.          Outcome Measures:  [facility] Basic Mobility  Turning from your back to your side while in a flat bed without using bedrails: A lot  Moving from lying on your back to sitting on the side of a flat bed without using bedrails: A lot  Moving to and from bed to chair (including a wheelchair): A lot  Standing up from a chair using your arms (e.g. wheelchair or bedside chair): A lot  To walk in hospital room: Total  Climbing 3-5 steps with railing: Total  Basic Mobility - Total Score: 10    Education Documentation  Mobility Training, taught by Nelda Jacob PTA at 11/30/2023  1:29 PM.  Learner: Patient  Readiness: Acceptance  Method: Explanation, Demonstration  Response: Needs Reinforcement  Comment: See therapy note.      EDUCATION:  Outpatient Education  Individual(s) Educated: Patient  Education Provided: Body Mechanics, Fall Risk, Home Safety, Home Exercise Program, POC  (Safety with bed mobility/transfer training.)    Encounter Problems       Encounter Problems (Active)       PT Problem       Pt will demonstrate cga with bed mobility to edge of bed.   (Progressing)       Start:  11/29/23    Expected End:  12/13/23            Pt will demonstrate cga with sit to stand/pivot transfers to chair with FWW for safety .   (Progressing)       Start:  11/29/23    Expected End:  12/13/23            Pt to demo improved BLE strength by being able to complete supine/seated thera ex 2x20 BLEs with 4 or less rest breaks .   (Progressing)       Start:  11/29/23    Expected End:  12/13/23

## 2023-11-30 NOTE — DISCHARGE SUMMARY
DISCHARGE DIAGNOSIS     Mechanical fall  Generalized weakness  Chronic dizziness  Chronic cervical spinal DJD  Chronic lumbar spinal DJD  R ankle pain  Acute cystitis  Macrocytic anemia d/t B12 deficiency  Subclinical hypothyroidism     HOSPITAL COURSE AND DETAILS     Mechanical fall  Generalized weakness  Chronic dizziness  Chronic cervical spinal DJD  Chronic lumbar spinal DJD  R ankle pain  - imaging in ED neg for acute findings  - CK wnl  - no fnd on exam, largely benign exam  Plan:  - PT/OT evaluated, will be going to snf     Acute cystitis  - symptoms of increased frequency, UA showed mod LE, 4+ bacteria  - s/p empiric CTX  x2d here, Ucx prelim results with >100k CFU GNR, final speciation pending  - will send with empiric cefdinir x1d additional to complete x3d  - will fu final ucx results and make adjustments if needed     CKD3  - at baseline     Macrocytic anemia  B12 deficiency  - Hgb 10.9 on admission, no recent baseline  - Hgb stable, no bleeding  - anemia labs showed B12 deficiency  - started on B12 supplementation, cont on discharge     Subclinical hypothyroidism  - TSH 4.84, FT4 0.91  - consider repeat as outpt in 1-2 months     HTN  DLD  - home meds     DNR/DNI    Stable for dc to SNF. Total time spent on discharge services 31 minutes.     DISCHARGE PHYSICAL EXAM     Last Recorded Vitals:  Vitals:    11/29/23 1942 11/30/23 0023 11/30/23 0743 11/30/23 1200   BP: 143/65 125/60 (!) 197/76 180/73   Patient Position: Sitting      Pulse: 77 81 63 72   Resp: 20 15     Temp: 36.4 °C (97.5 °F) 36.8 °C (98.2 °F) 35.9 °C (96.6 °F) 35.8 °C (96.4 °F)   TempSrc:       SpO2: 97% 98% 99% 98%   Weight:       Height:           Physical Exam:  GEN: healthy appearing, appears stated age, NAD  HEENT: NCAT  NECK: supple, no masses  CV: RRR, no m/r/g, trace LE edema  LUNGS: CTAB  ABD: soft, NT  SKIN: no rashes  MSK; no gross deformities, normal joints  NEURO: A+Ox3, no FND, generalized weakness 4/5 in all  extremities  PSYCH: appropriate mood, affect      PERTINENT LABS AND IMAGING     Results for orders placed or performed during the hospital encounter of 11/28/23 (from the past 96 hour(s))   Sars-CoV-2 and Influenza A/B PCR   Result Value Ref Range    Flu A Result Not Detected Not Detected    Flu B Result Not Detected Not Detected    Coronavirus 2019, PCR Not Detected Not Detected   CBC and Auto Differential   Result Value Ref Range    WBC 10.0 4.4 - 11.3 x10*3/uL    nRBC 0.0 0.0 - 0.0 /100 WBCs    RBC 2.52 (L) 4.00 - 5.20 x10*6/uL    Hemoglobin 10.9 (L) 12.0 - 16.0 g/dL    Hematocrit 31.3 (L) 36.0 - 46.0 %     (H) 80 - 100 fL    MCH 43.3 (H) 26.0 - 34.0 pg    MCHC 34.8 32.0 - 36.0 g/dL    RDW 14.3 11.5 - 14.5 %    Platelets 244 150 - 450 x10*3/uL    Neutrophils % 70.0 40.0 - 80.0 %    Immature Granulocytes %, Automated 0.2 0.0 - 0.9 %    Lymphocytes % 15.8 13.0 - 44.0 %    Monocytes % 13.3 2.0 - 10.0 %    Eosinophils % 0.4 0.0 - 6.0 %    Basophils % 0.3 0.0 - 2.0 %    Neutrophils Absolute 7.01 (H) 1.60 - 5.50 x10*3/uL    Immature Granulocytes Absolute, Automated 0.02 0.00 - 0.50 x10*3/uL    Lymphocytes Absolute 1.58 0.80 - 3.00 x10*3/uL    Monocytes Absolute 1.33 (H) 0.05 - 0.80 x10*3/uL    Eosinophils Absolute 0.04 0.00 - 0.40 x10*3/uL    Basophils Absolute 0.03 0.00 - 0.10 x10*3/uL   Comprehensive Metabolic Panel   Result Value Ref Range    Glucose 103 (H) 74 - 99 mg/dL    Sodium 138 136 - 145 mmol/L    Potassium 4.3 3.5 - 5.3 mmol/L    Chloride 104 98 - 107 mmol/L    Bicarbonate 25 21 - 32 mmol/L    Anion Gap 13 10 - 20 mmol/L    Urea Nitrogen 30 (H) 6 - 23 mg/dL    Creatinine 1.22 (H) 0.50 - 1.05 mg/dL    eGFR 44 (L) >60 mL/min/1.73m*2    Calcium 9.5 8.6 - 10.3 mg/dL    Albumin 4.1 3.4 - 5.0 g/dL    Alkaline Phosphatase 56 33 - 136 U/L    Total Protein 7.9 6.4 - 8.2 g/dL    AST 26 9 - 39 U/L    Bilirubin, Total 0.8 0.0 - 1.2 mg/dL    ALT 13 7 - 45 U/L   Magnesium   Result Value Ref Range    Magnesium  1.92 1.60 - 2.40 mg/dL   Lactate   Result Value Ref Range    Lactate 1.3 0.4 - 2.0 mmol/L   Protime-INR   Result Value Ref Range    Protime 12.5 9.8 - 12.8 seconds    INR 1.1 0.9 - 1.1   B-Type Natriuretic Peptide   Result Value Ref Range    BNP 94 0 - 99 pg/mL   Creatine Kinase   Result Value Ref Range    Creatine Kinase 123 0 - 215 U/L   Troponin I, High Sensitivity, Initial   Result Value Ref Range    Troponin I, High Sensitivity 24 (H) 0 - 13 ng/L   Pathologist Review-CBC Differential   Result Value Ref Range    Pathologist Review-CBC Differential       Macrocytosis and hypersegmented neutrophils suggestive of Folate or B12 deficiency.   Morphology   Result Value Ref Range    RBC Morphology See Below     Ovalocytes Few    Reticulocytes   Result Value Ref Range    Retic % 2.1 (H) 0.5 - 2.0 %    Retic Absolute 0.052 0.017 - 0.110 x10*6/uL    Reticulocyte Hemoglobin 36 28 - 38 pg    Immature Retic fraction 14.8 <=16.0 %   Troponin, High Sensitivity, 1 Hour   Result Value Ref Range    Troponin I, High Sensitivity 23 (H) 0 - 13 ng/L   TSH   Result Value Ref Range    Thyroid Stimulating Hormone 4.84 (H) 0.44 - 3.98 mIU/L   Ferritin   Result Value Ref Range    Ferritin 172 (H) 8 - 150 ng/mL   Iron and TIBC   Result Value Ref Range    Iron 46 35 - 150 ug/dL    UIBC 267 110 - 370 ug/dL    TIBC 313 240 - 445 ug/dL    % Saturation 15 (L) 25 - 45 %   Folate   Result Value Ref Range    Folate, Serum >22.3 >5.0 ng/mL   Vitamin B12   Result Value Ref Range    Vitamin B12 54 (L) 211 - 911 pg/mL   Urinalysis with Reflex Microscopic and Culture   Result Value Ref Range    Color, Urine Yellow Straw, Yellow    Appearance, Urine Clear Clear    Specific Gravity, Urine 1.020 1.005 - 1.035    pH, Urine 5.0 5.0, 5.5, 6.0, 6.5, 7.0, 7.5, 8.0    Protein, Urine 30 (1+) (N) NEGATIVE mg/dL    Glucose, Urine NEGATIVE NEGATIVE mg/dL    Blood, Urine NEGATIVE NEGATIVE    Ketones, Urine 20 (1+) (A) NEGATIVE mg/dL    Bilirubin, Urine NEGATIVE  NEGATIVE    Urobilinogen, Urine <2.0 <2.0 mg/dL    Nitrite, Urine NEGATIVE NEGATIVE    Leukocyte Esterase, Urine MODERATE (2+) (A) NEGATIVE   Extra Urine Gray Tube   Result Value Ref Range    Extra Tube Hold for add-ons.    Microscopic Only, Urine   Result Value Ref Range    WBC, Urine 21-50 (A) 1-5, NONE /HPF    RBC, Urine 1-2 NONE, 1-2, 3-5 /HPF    Bacteria, Urine 4+ (A) NONE SEEN /HPF    Mucus, Urine 3+ Reference range not established. /LPF   Urine Culture    Specimen: Clean Catch/Voided; Urine   Result Value Ref Range    Urine Culture >100,000 Enteric bacilli (A)    SST TOP   Result Value Ref Range    Extra Tube Hold for add-ons.    Magnesium   Result Value Ref Range    Magnesium 2.03 1.60 - 2.40 mg/dL   Basic Metabolic Panel   Result Value Ref Range    Glucose 89 74 - 99 mg/dL    Sodium 137 136 - 145 mmol/L    Potassium 4.2 3.5 - 5.3 mmol/L    Chloride 104 98 - 107 mmol/L    Bicarbonate 27 21 - 32 mmol/L    Anion Gap 10 10 - 20 mmol/L    Urea Nitrogen 30 (H) 6 - 23 mg/dL    Creatinine 1.36 (H) 0.50 - 1.05 mg/dL    eGFR 39 (L) >60 mL/min/1.73m*2    Calcium 8.9 8.6 - 10.3 mg/dL   CBC   Result Value Ref Range    WBC 6.4 4.4 - 11.3 x10*3/uL    nRBC 0.0 0.0 - 0.0 /100 WBCs    RBC 2.16 (L) 4.00 - 5.20 x10*6/uL    Hemoglobin 9.3 (L) 12.0 - 16.0 g/dL    Hematocrit 27.7 (L) 36.0 - 46.0 %     (H) 80 - 100 fL    MCH 43.1 (H) 26.0 - 34.0 pg    MCHC 33.6 32.0 - 36.0 g/dL    RDW 14.6 (H) 11.5 - 14.5 %    Platelets 187 150 - 450 x10*3/uL   T4, free   Result Value Ref Range    Thyroxine, Free 0.91 0.61 - 1.12 ng/dL   POCT GLUCOSE   Result Value Ref Range    POCT Glucose 139 (H) 74 - 99 mg/dL   Magnesium   Result Value Ref Range    Magnesium 1.90 1.60 - 2.40 mg/dL   Basic Metabolic Panel   Result Value Ref Range    Glucose 99 74 - 99 mg/dL    Sodium 137 136 - 145 mmol/L    Potassium 4.1 3.5 - 5.3 mmol/L    Chloride 103 98 - 107 mmol/L    Bicarbonate 28 21 - 32 mmol/L    Anion Gap 10 10 - 20 mmol/L    Urea Nitrogen 28 (H)  6 - 23 mg/dL    Creatinine 1.20 (H) 0.50 - 1.05 mg/dL    eGFR 45 (L) >60 mL/min/1.73m*2    Calcium 8.9 8.6 - 10.3 mg/dL   CBC   Result Value Ref Range    WBC 6.1 4.4 - 11.3 x10*3/uL    nRBC 0.0 0.0 - 0.0 /100 WBCs    RBC 2.40 (L) 4.00 - 5.20 x10*6/uL    Hemoglobin 10.2 (L) 12.0 - 16.0 g/dL    Hematocrit 30.2 (L) 36.0 - 46.0 %     (H) 80 - 100 fL    MCH 42.5 (H) 26.0 - 34.0 pg    MCHC 33.8 32.0 - 36.0 g/dL    RDW 14.6 (H) 11.5 - 14.5 %    Platelets 206 150 - 450 x10*3/uL   SST TOP   Result Value Ref Range    Extra Tube Hold for add-ons.         CT lumbar spine wo IV contrast   Final Result   No lumbar vertebral compression deformity or acute displaced fracture.        Scoliosis with multilevel degenerative changes and mild   spondylolisthesis.        Paraspinal/soft tissue findings as above including rectal fecal   retention and cholelithiasis.        MACRO:   None.        Signed by: Kimi Louis 11/28/2023 2:06 PM   Dictation workstation:   [id number]      CT head wo IV contrast   Final Result   No acute intracranial hemorrhage or mass-effect.        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        MACRO:   None.        Signed by: Kimi Louis 11/28/2023 1:55 PM   Dictation workstation:   [id number]      CT cervical spine wo IV contrast   Final Result   No cervical vertebral compression deformity or acute displaced   fracture. Tiny smooth possible remote fracture defect in the anterior   arch of C1 and probable surgical absence of the posterior arch of C1.   Patient is also status post suboccipital craniectomy.        Multilevel degenerative changes with mild spondylolisthesis.        Reticular densities in both lung apices, differential includes   pulmonary fibrosis.        MACRO:   None.        Signed by: Kimi Louis 11/28/2023 2:00 PM   Dictation workstation:   [id number]      XR ankle right 3+ views   Final Result   1. No evidence of acute fracture or dislocation.         MACRO:   None.        Signed by: Yohannes Velasquez 11/28/2023 1:31 PM   Dictation workstation:   [id number]      XR chest 1 view   Final Result   No acute cardiopulmonary abnormality.        Diffuse reticular interstitial pulmonary opacities suggestive of   fibrotic change.        Signed by: Edilberto Yoon 11/28/2023 1:09 PM   Dictation workstation:   [id number]          No echocardiogram results found for the past 14 days    DISCHARGE MEDICATIONS        Your medication list        START taking these medications        Instructions Last Dose Given Next Dose Due   cefdinir 300 mg capsule  Commonly known as: Omnicef      Take 1 capsule (300 mg) by mouth 2 times a day for 1 day.       cyanocobalamin 1,000 mcg tablet  Commonly known as: Vitamin B-12  Start taking on: December 1, 2023      Take 1 tablet (1,000 mcg) by mouth once daily. Do not start before December 1, 2023.       meclizine 25 mg tablet  Commonly known as: Antivert      Take 1 tablet (25 mg) by mouth 3 times a day as needed for dizziness.              CONTINUE taking these medications        Instructions Last Dose Given Next Dose Due   atorvastatin 10 mg tablet  Commonly known as: Lipitor           losartan-hydrochlorothiazide 100-12.5 mg tablet  Commonly known as: Hyzaar                     Where to Get Your Medications        Information about where to get these medications is not yet available    Ask your nurse or doctor about these medications  cefdinir 300 mg capsule  cyanocobalamin 1,000 mcg tablet  meclizine 25 mg tablet         OUTPATIENT FOLLOW-UP     No future appointments.

## 2023-11-30 NOTE — PROGRESS NOTES
Dandre Lora has been obtained Pt. Will discharge this date to the [facility] snf at 4:00pm via ambulance. Message left for songisell to inform.

## 2023-11-30 NOTE — PROGRESS NOTES
Precert has been obtained.  Physician and [facility]-alexandria aware, will await final discharge orders.

## 2023-12-01 LAB — BACTERIA UR CULT: ABNORMAL

## 2023-12-01 PROCEDURE — 96375 TX/PRO/DX INJ NEW DRUG ADDON: CPT | Performed by: INTERNAL MEDICINE

## 2023-12-01 PROCEDURE — 96365 THER/PROPH/DIAG IV INF INIT: CPT | Performed by: INTERNAL MEDICINE

## 2023-12-01 PROCEDURE — G0378 HOSPITAL OBSERVATION PER HR: HCPCS
